# Patient Record
Sex: MALE | Race: WHITE | NOT HISPANIC OR LATINO
[De-identification: names, ages, dates, MRNs, and addresses within clinical notes are randomized per-mention and may not be internally consistent; named-entity substitution may affect disease eponyms.]

---

## 2020-12-15 PROBLEM — Z00.00 ENCOUNTER FOR PREVENTIVE HEALTH EXAMINATION: Status: ACTIVE | Noted: 2020-12-15

## 2020-12-16 ENCOUNTER — APPOINTMENT (OUTPATIENT)
Dept: GASTROENTEROLOGY | Facility: CLINIC | Age: 75
End: 2020-12-16
Payer: MEDICARE

## 2020-12-16 VITALS
OXYGEN SATURATION: 95 % | BODY MASS INDEX: 35.19 KG/M2 | TEMPERATURE: 97.5 F | DIASTOLIC BLOOD PRESSURE: 74 MMHG | HEART RATE: 54 BPM | HEIGHT: 75 IN | SYSTOLIC BLOOD PRESSURE: 120 MMHG | WEIGHT: 283 LBS

## 2020-12-16 DIAGNOSIS — Z86.79 PERSONAL HISTORY OF OTHER DISEASES OF THE CIRCULATORY SYSTEM: ICD-10-CM

## 2020-12-16 DIAGNOSIS — Z86.711 PERSONAL HISTORY OF PULMONARY EMBOLISM: ICD-10-CM

## 2020-12-16 DIAGNOSIS — Z80.0 FAMILY HISTORY OF MALIGNANT NEOPLASM OF DIGESTIVE ORGANS: ICD-10-CM

## 2020-12-16 DIAGNOSIS — Z86.718 PERSONAL HISTORY OF OTHER VENOUS THROMBOSIS AND EMBOLISM: ICD-10-CM

## 2020-12-16 DIAGNOSIS — Z86.010 PERSONAL HISTORY OF COLONIC POLYPS: ICD-10-CM

## 2020-12-16 DIAGNOSIS — Z87.891 PERSONAL HISTORY OF NICOTINE DEPENDENCE: ICD-10-CM

## 2020-12-16 DIAGNOSIS — C78.7 SECONDARY MALIGNANT NEOPLASM OF LIVER AND INTRAHEPATIC BILE DUCT: ICD-10-CM

## 2020-12-16 DIAGNOSIS — R93.5 ABNORMAL FINDINGS ON DIAGNOSTIC IMAGING OF OTHER ABDOMINAL REGIONS, INCLUDING RETROPERITONEUM: ICD-10-CM

## 2020-12-16 DIAGNOSIS — Z80.7 FAMILY HISTORY OF OTHER MALIGNANT NEOPLASMS OF LYMPHOID, HEMATOPOIETIC AND RELATED TISSUES: ICD-10-CM

## 2020-12-16 DIAGNOSIS — C80.1 SECONDARY MALIGNANT NEOPLASM OF LIVER AND INTRAHEPATIC BILE DUCT: ICD-10-CM

## 2020-12-16 PROCEDURE — 99205 OFFICE O/P NEW HI 60 MIN: CPT

## 2020-12-16 RX ORDER — RIVAROXABAN 15 MG/1
15 TABLET, FILM COATED ORAL
Refills: 0 | Status: ACTIVE | COMMUNITY

## 2020-12-17 NOTE — HISTORY OF PRESENT ILLNESS
[FreeTextEntry1] : The patient is a 75-year-old man who was discharged on Monday after a 10-day hospitalization at [facility].  He was admitted with bilateral DVTs and a PE.  He underwent venous thrombectomy of the left leg and is on Xarelto.  In the hospital CT scan of the abdomen and pelvis showed multiple hypodense hepatic lesions in both lobes of the liver which were new from May 27, 2020 compatible with metastatic disease.  There was also extensive likely partially necrotic periportal/peripancreatic, mild para-aortic and pelvic lymphadenopathy, suspicious for malignant lymphadenopathy.  The patient underwent a liver biopsy on Monday but the results are still pending.  Of note, blood work in the hospital revealed a CA 19-9 greater than 10,000 as well as a CEA of 52.9.  The patient has a history of adenomatous colonic polyps and his last colonoscopy was in December 2012, which was poorly prepped.  He has a history of prostate cancer and underwent radical prostatectomy in July.  He has a history of a gastric bypass 14 years ago.  He has been feeling well denying abdominal pain, heartburn, dysphagia, nausea, vomiting.  He reports a good appetite.  He moves his bowels once every 3 days which is his normal pattern and he denies melena or bright red blood per rectum.  Of note, the patient has lost 35 pounds since his prostatectomy in July.  The patient has not had any other hospitalizations in the past year and denies any cardiac history.

## 2020-12-17 NOTE — REASON FOR VISIT
[Spouse] : spouse [FreeTextEntry1] : Liver metastases, abnormal CT scan, history of adenomatous polyps

## 2020-12-17 NOTE — ASSESSMENT
[FreeTextEntry1] : Patient with CT scan showing multiple metastases in both lobes of the liver and significant malignant lymphadenopathy.  No clear primary was seen on the CT scan but CA 19-9 is markedly elevated greater than 10,000.  Liver biopsy results are still pending.  The patient does have a history of prostate cancer and a history of adenomatous colonic polyps.  He has just been released from the hospital after having bilateral DVT and pulmonary embolism.  The patient is on Xarelto.\par \par I had a long discussion with the patient and his wife regarding the significance of these findings.  The liver biopsy results may prove to be very telling as the blood work strongly suggest that this is metastatic pancreatic cancer.\par \par The patient is already seeing Dr. Dany Lua for oncologic treatment.  I advised that they must follow-up with him once the biopsy results are available.\par \par Although the patient is definitely in need of a colonoscopy, I would only proceed with this at this point if it would have any impact on his oncologic management.  The patient has just had a pulmonary embolism and and has recently been started on anticoagulation.

## 2020-12-17 NOTE — CONSULT LETTER
[FreeTextEntry1] : Dear Dr. Bautista  and Dr. Dany Lua\[facility] \[facility] I had the pleasure of seeing your patient SPENSER ESQUIVEL in the office today.  My office note is attached. PLEASE READ THE "ASSESSMENT" SECTION OF THE NOTE TO SEE MY IMPRESSION AND PLAN.\par \[facility] Thank you very much for allowing me to participate in the care of your patient.\[facility] \[facility] Sincerely,\[facility] \[facility] Rupesh Allison M.D., FAC, FACP\[facility] Director, Celiac Program at [facility]  of Medicine\[facility] [employer] at [facility] [place]/[facility] Director,[facility] - Gastroenterology/Internal Medicine at [address]\[place], [place] [address] Tel: [phone]\[facility] Email: [email or web address] \[facility] \[facility] The attached note has been created using a voice recognition system (Dragon).  There may be some misspellings and typos.  Please call my office if you have any issues or questions.

## 2021-01-08 ENCOUNTER — TRANSCRIPTION ENCOUNTER (OUTPATIENT)
Age: 76
End: 2021-01-08

## 2021-01-30 DIAGNOSIS — Z01.818 ENCOUNTER FOR OTHER PREPROCEDURAL EXAMINATION: ICD-10-CM

## 2021-01-31 ENCOUNTER — APPOINTMENT (OUTPATIENT)
Dept: DISASTER EMERGENCY | Facility: CLINIC | Age: 76
End: 2021-01-31

## 2021-02-01 LAB — SARS-COV-2 N GENE NPH QL NAA+PROBE: NOT DETECTED

## 2021-02-04 ENCOUNTER — RESULT REVIEW (OUTPATIENT)
Age: 76
End: 2021-02-04

## 2021-02-04 ENCOUNTER — APPOINTMENT (OUTPATIENT)
Dept: ENDOVASCULAR SURGERY | Facility: CLINIC | Age: 76
End: 2021-02-04
Payer: MEDICARE

## 2021-02-04 VITALS
SYSTOLIC BLOOD PRESSURE: 153 MMHG | TEMPERATURE: 98 F | HEART RATE: 97 BPM | BODY MASS INDEX: 35.19 KG/M2 | HEIGHT: 75 IN | WEIGHT: 283 LBS | OXYGEN SATURATION: 100 % | RESPIRATION RATE: 15 BRPM | DIASTOLIC BLOOD PRESSURE: 92 MMHG

## 2021-02-04 DIAGNOSIS — C61 MALIGNANT NEOPLASM OF PROSTATE: ICD-10-CM

## 2021-02-04 PROCEDURE — 76937 US GUIDE VASCULAR ACCESS: CPT

## 2021-02-04 PROCEDURE — 36561 INSERT TUNNELED CV CATH: CPT

## 2021-02-04 PROCEDURE — 77001 FLUOROGUIDE FOR VEIN DEVICE: CPT

## 2021-02-04 NOTE — PAST MEDICAL HISTORY
[Increasing age ( >40 years old)] : Increasing age ( >40 years old) [No therapy indicated for cases scheduled for less than one hour] : No therapy indicated for cases scheduled for less than one hour. [FreeTextEntry1] : Malignant Hyperthermia Screening Tool and Risk of Bleeding Assessment \par \par Mr. SPENSER ESQUIVEL denies family of unexpected death following Anesthesia or Exercise.\par Denies Family history of Malignant Hyperthermia, Muscle or Neuromuscular disorder and High Temperature  following exercise.\par \par Mr. SPENSER ESQUIVEL denies history of Muscle Spasm, Dark or Chocolate- Colored and Unanticipated fever immediately following anaesthesia or serious exercise.\par Mr. SPENSER ESQUIVEL also denies bleeding tendencies/Risks of Bleeding.\par

## 2021-02-04 NOTE — ASSESSMENT
[FreeTextEntry1] : Pt seen and assessed for placement of port for venous access.  Chart reviewed, imaging and labs evaluated and acceptable for intervention. Consent obtained with risks, benefits explained.  Will place port with sedation.\par \par 8 [place] powerport placed using US and fluoro guidance.  Tip in SVC.  EBL=minimal.  Full report to follow.\par

## 2021-02-04 NOTE — HISTORY OF PRESENT ILLNESS
[FreeTextEntry1] : alert and oriented x 3 \par accompanied by daughter Brandy [phone]\par no reported falls \par covid test negative 1/31/2021\par  last Xarelto Sunday  [FreeTextEntry5] : yesterday at 6 pm [FreeTextEntry6] : Dr Elizondo

## 2022-01-11 ENCOUNTER — INPATIENT (INPATIENT)
Facility: HOSPITAL | Age: 77
LOS: 3 days | Discharge: HOME | DRG: 64 | End: 2022-01-15
Attending: INTERNAL MEDICINE | Admitting: INTERNAL MEDICINE
Payer: MEDICARE

## 2022-01-11 VITALS
OXYGEN SATURATION: 98 % | DIASTOLIC BLOOD PRESSURE: 78 MMHG | RESPIRATION RATE: 16 BRPM | SYSTOLIC BLOOD PRESSURE: 148 MMHG | HEART RATE: 85 BPM

## 2022-01-11 DIAGNOSIS — I63.9 CEREBRAL INFARCTION, UNSPECIFIED: ICD-10-CM

## 2022-01-11 DIAGNOSIS — Z91.89 OTHER SPECIFIED PERSONAL RISK FACTORS, NOT ELSEWHERE CLASSIFIED: ICD-10-CM

## 2022-01-11 DIAGNOSIS — R41.82 ALTERED MENTAL STATUS, UNSPECIFIED: ICD-10-CM

## 2022-01-11 LAB
ALBUMIN SERPL ELPH-MCNC: 2.7 G/DL — LOW (ref 3.3–5)
ALP SERPL-CCNC: 156 U/L — HIGH (ref 40–120)
ALT FLD-CCNC: 14 U/L — SIGNIFICANT CHANGE UP (ref 10–45)
ANION GAP SERPL CALC-SCNC: 10 MMOL/L — SIGNIFICANT CHANGE UP (ref 5–17)
ANISOCYTOSIS BLD QL: SLIGHT — SIGNIFICANT CHANGE UP
APPEARANCE UR: CLEAR — SIGNIFICANT CHANGE UP
APTT BLD: 29.3 SEC — SIGNIFICANT CHANGE UP (ref 27.5–35.5)
AST SERPL-CCNC: 23 U/L — SIGNIFICANT CHANGE UP (ref 10–40)
BACTERIA # UR AUTO: ABNORMAL
BASE EXCESS BLDV CALC-SCNC: 4 MMOL/L — HIGH (ref -2–2)
BASOPHILS # BLD AUTO: 0 K/UL — SIGNIFICANT CHANGE UP (ref 0–0.2)
BASOPHILS NFR BLD AUTO: 0 % — SIGNIFICANT CHANGE UP (ref 0–2)
BILIRUB SERPL-MCNC: 1.2 MG/DL — SIGNIFICANT CHANGE UP (ref 0.2–1.2)
BILIRUB UR-MCNC: NEGATIVE — SIGNIFICANT CHANGE UP
BUN SERPL-MCNC: 19 MG/DL — SIGNIFICANT CHANGE UP (ref 7–23)
CA-I SERPL-SCNC: 1.19 MMOL/L — SIGNIFICANT CHANGE UP (ref 1.15–1.33)
CALCIUM SERPL-MCNC: 8.3 MG/DL — LOW (ref 8.4–10.5)
CHLORIDE BLDV-SCNC: 106 MMOL/L — SIGNIFICANT CHANGE UP (ref 96–108)
CHLORIDE SERPL-SCNC: 106 MMOL/L — SIGNIFICANT CHANGE UP (ref 96–108)
CO2 BLDV-SCNC: 31 MMOL/L — HIGH (ref 22–26)
CO2 SERPL-SCNC: 22 MMOL/L — SIGNIFICANT CHANGE UP (ref 22–31)
COLOR SPEC: YELLOW — SIGNIFICANT CHANGE UP
CREAT SERPL-MCNC: 0.8 MG/DL — SIGNIFICANT CHANGE UP (ref 0.5–1.3)
DACRYOCYTES BLD QL SMEAR: SLIGHT — SIGNIFICANT CHANGE UP
DIFF PNL FLD: NEGATIVE — SIGNIFICANT CHANGE UP
ELLIPTOCYTES BLD QL SMEAR: SIGNIFICANT CHANGE UP
EOSINOPHIL # BLD AUTO: 0.36 K/UL — SIGNIFICANT CHANGE UP (ref 0–0.5)
EOSINOPHIL NFR BLD AUTO: 4.2 % — SIGNIFICANT CHANGE UP (ref 0–6)
EPI CELLS # UR: 2 /HPF — SIGNIFICANT CHANGE UP
GAS PNL BLDV: 135 MMOL/L — LOW (ref 136–145)
GAS PNL BLDV: SIGNIFICANT CHANGE UP
GLUCOSE BLDV-MCNC: 201 MG/DL — HIGH (ref 70–99)
GLUCOSE SERPL-MCNC: 196 MG/DL — HIGH (ref 70–99)
GLUCOSE UR QL: NEGATIVE — SIGNIFICANT CHANGE UP
HCO3 BLDV-SCNC: 29 MMOL/L — SIGNIFICANT CHANGE UP (ref 22–29)
HCT VFR BLD CALC: 30.5 % — LOW (ref 39–50)
HCT VFR BLDA CALC: 29 % — LOW (ref 39–51)
HGB BLD CALC-MCNC: 9.8 G/DL — LOW (ref 12.6–17.4)
HGB BLD-MCNC: 9.8 G/DL — LOW (ref 13–17)
HYALINE CASTS # UR AUTO: 1 /LPF — SIGNIFICANT CHANGE UP (ref 0–2)
INR BLD: 1.59 RATIO — HIGH (ref 0.88–1.16)
KETONES UR-MCNC: NEGATIVE — SIGNIFICANT CHANGE UP
LACTATE BLDV-MCNC: 1.5 MMOL/L — SIGNIFICANT CHANGE UP (ref 0.7–2)
LEUKOCYTE ESTERASE UR-ACNC: ABNORMAL
LYMPHOCYTES # BLD AUTO: 0.74 K/UL — LOW (ref 1–3.3)
LYMPHOCYTES # BLD AUTO: 8.5 % — LOW (ref 13–44)
MANUAL SMEAR VERIFICATION: SIGNIFICANT CHANGE UP
MCHC RBC-ENTMCNC: 32.1 GM/DL — SIGNIFICANT CHANGE UP (ref 32–36)
MCHC RBC-ENTMCNC: 32.8 PG — SIGNIFICANT CHANGE UP (ref 27–34)
MCV RBC AUTO: 102 FL — HIGH (ref 80–100)
MONOCYTES # BLD AUTO: 0.44 K/UL — SIGNIFICANT CHANGE UP (ref 0–0.9)
MONOCYTES NFR BLD AUTO: 5.1 % — SIGNIFICANT CHANGE UP (ref 2–14)
NEUTROPHILS # BLD AUTO: 7.12 K/UL — SIGNIFICANT CHANGE UP (ref 1.8–7.4)
NEUTROPHILS NFR BLD AUTO: 82.2 % — HIGH (ref 43–77)
NITRITE UR-MCNC: POSITIVE
NRBC # BLD: 1 /100 — HIGH (ref 0–0)
PCO2 BLDV: 46 MMHG — SIGNIFICANT CHANGE UP (ref 42–55)
PH BLDV: 7.41 — SIGNIFICANT CHANGE UP (ref 7.32–7.43)
PH UR: 6 — SIGNIFICANT CHANGE UP (ref 5–8)
PLAT MORPH BLD: NORMAL — SIGNIFICANT CHANGE UP
PLATELET # BLD AUTO: 48 K/UL — LOW (ref 150–400)
PO2 BLDV: 18 MMHG — LOW (ref 25–45)
POIKILOCYTOSIS BLD QL AUTO: SIGNIFICANT CHANGE UP
POLYCHROMASIA BLD QL SMEAR: SLIGHT — SIGNIFICANT CHANGE UP
POTASSIUM BLDV-SCNC: 4.1 MMOL/L — SIGNIFICANT CHANGE UP (ref 3.5–5.1)
POTASSIUM SERPL-MCNC: 4.2 MMOL/L — SIGNIFICANT CHANGE UP (ref 3.5–5.3)
POTASSIUM SERPL-SCNC: 4.2 MMOL/L — SIGNIFICANT CHANGE UP (ref 3.5–5.3)
PROT SERPL-MCNC: 5.1 G/DL — LOW (ref 6–8.3)
PROT UR-MCNC: SIGNIFICANT CHANGE UP
PROTHROM AB SERPL-ACNC: 18.7 SEC — HIGH (ref 10.6–13.6)
RBC # BLD: 2.99 M/UL — LOW (ref 4.2–5.8)
RBC # FLD: 15.8 % — HIGH (ref 10.3–14.5)
RBC BLD AUTO: ABNORMAL
RBC CASTS # UR COMP ASSIST: 1 /HPF — SIGNIFICANT CHANGE UP (ref 0–4)
SAO2 % BLDV: 27.6 % — LOW (ref 67–88)
SARS-COV-2 RNA SPEC QL NAA+PROBE: SIGNIFICANT CHANGE UP
SODIUM SERPL-SCNC: 138 MMOL/L — SIGNIFICANT CHANGE UP (ref 135–145)
SP GR SPEC: 1.05 — SIGNIFICANT CHANGE UP (ref 1.01–1.02)
TROPONIN T, HIGH SENSITIVITY RESULT: 38 NG/L — SIGNIFICANT CHANGE UP (ref 0–51)
TROPONIN T, HIGH SENSITIVITY RESULT: 40 NG/L — SIGNIFICANT CHANGE UP (ref 0–51)
UROBILINOGEN FLD QL: NEGATIVE — SIGNIFICANT CHANGE UP
WBC # BLD: 8.66 K/UL — SIGNIFICANT CHANGE UP (ref 3.8–10.5)
WBC # FLD AUTO: 8.66 K/UL — SIGNIFICANT CHANGE UP (ref 3.8–10.5)
WBC UR QL: 14 /HPF — HIGH (ref 0–5)

## 2022-01-11 PROCEDURE — 70498 CT ANGIOGRAPHY NECK: CPT | Mod: 26,MA

## 2022-01-11 PROCEDURE — 71045 X-RAY EXAM CHEST 1 VIEW: CPT | Mod: 26

## 2022-01-11 PROCEDURE — 70496 CT ANGIOGRAPHY HEAD: CPT | Mod: 26,MA

## 2022-01-11 PROCEDURE — 93010 ELECTROCARDIOGRAM REPORT: CPT

## 2022-01-11 PROCEDURE — 99285 EMERGENCY DEPT VISIT HI MDM: CPT | Mod: GC

## 2022-01-11 RX ORDER — CEFTRIAXONE 500 MG/1
1000 INJECTION, POWDER, FOR SOLUTION INTRAMUSCULAR; INTRAVENOUS ONCE
Refills: 0 | Status: COMPLETED | OUTPATIENT
Start: 2022-01-11 | End: 2022-01-11

## 2022-01-11 RX ORDER — RIVAROXABAN 15 MG-20MG
20 KIT ORAL
Refills: 0 | Status: DISCONTINUED | OUTPATIENT
Start: 2022-01-11 | End: 2022-01-14

## 2022-01-11 RX ORDER — SODIUM CHLORIDE 9 MG/ML
1000 INJECTION INTRAMUSCULAR; INTRAVENOUS; SUBCUTANEOUS
Refills: 0 | Status: DISCONTINUED | OUTPATIENT
Start: 2022-01-11 | End: 2022-01-15

## 2022-01-11 RX ORDER — CEFTRIAXONE 500 MG/1
1000 INJECTION, POWDER, FOR SOLUTION INTRAMUSCULAR; INTRAVENOUS EVERY 24 HOURS
Refills: 0 | Status: DISCONTINUED | OUTPATIENT
Start: 2022-01-11 | End: 2022-01-15

## 2022-01-11 RX ORDER — RIVAROXABAN 15 MG-20MG
20 KIT ORAL ONCE
Refills: 0 | Status: COMPLETED | OUTPATIENT
Start: 2022-01-11 | End: 2022-01-11

## 2022-01-11 RX ORDER — DOCUSATE SODIUM 100 MG
2 CAPSULE ORAL
Qty: 0 | Refills: 0 | DISCHARGE

## 2022-01-11 RX ADMIN — SODIUM CHLORIDE 75 MILLILITER(S): 9 INJECTION INTRAMUSCULAR; INTRAVENOUS; SUBCUTANEOUS at 21:35

## 2022-01-11 RX ADMIN — RIVAROXABAN 20 MILLIGRAM(S): KIT at 12:41

## 2022-01-11 RX ADMIN — CEFTRIAXONE 100 MILLIGRAM(S): 500 INJECTION, POWDER, FOR SOLUTION INTRAMUSCULAR; INTRAVENOUS at 12:41

## 2022-01-11 NOTE — ED PROVIDER NOTE - CLINICAL SUMMARY MEDICAL DECISION MAKING FREE TEXT BOX
76yr M hx of strokes, on xarelto prostate CA and DVT w acute confusional state as stroke alert. neurology saw in ED immediately and recommended CT and CTA. exam as above. concern for acute stroke, TIA vs toxic metabolic. plan for labs, ua, cxr, CT results, and if no identifiable source, will be admitted given safety concerns for falls or not being able to take care of himself.

## 2022-01-11 NOTE — CONSULT NOTE ADULT - SUBJECTIVE AND OBJECTIVE BOX
Patient is a 76y old  Male who presents with a chief complaint of code stroke (2022 17:18)      HPI:    76yr M hx of prior strokes, prostate CA on chemo, recent DVT diagnosis requiring thrombectomy , on xarelto p/w AMS. per wife, is being confused increasingly since last night. continued to mistake places at home and complaining of not being able to see the TV however was looking at other objects.   	  wife denies fever chills, n/v, abd pain, urinary sx, cough, sorethroat, sick contacts.  is covid vax and boosted  at baseline, is alert and oriented and is functional and ambulatory  presented as stroke alert  noted to have uti  (2022 21:23)    ER vss, afebrile.  No leukocytosis.  (+) UA      REVIEW OF SYSTEMS:    CONSTITUTIONAL: No fever, weight loss, or fatigue  EYES: No eye pain, visual disturbances, or discharge  ENMT:  No sore throat  NECK: No pain or stiffness  RESPIRATORY: No cough, wheezing, chills or hemoptysis; No shortness of breath  CARDIOVASCULAR: No chest pain, palpitations, dizziness, or leg swelling  GASTROINTESTINAL: No abdominal or epigastric pain. No nausea, vomiting, or hematemesis; No diarrhea or constipation. No melena or hematochezia.  GENITOURINARY: No dysuria, frequency, hematuria, or incontinence  NEUROLOGICAL: No headaches, memory loss, loss of strength, numbness, or tremors  SKIN: No itching, burning, rashes, or lesions   LYMPH NODES: No enlarged glands  MUSCULOSKELETAL: No joint pain or swelling; No muscle, back, or extremity pain      PAST MEDICAL & SURGICAL HISTORY:  CVA (cerebral vascular accident)    Deep vein thrombosis (DVT)    Prostate CA    Hepatobiliary cancer        Allergies    Allergy Status Unknown    Intolerances        FAMILY HISTORY:    No pertinent fam hx in 1st degree relatives    SOCIAL HISTORY:  .  Lives with wife.        MEDICATIONS  (STANDING):  cefTRIAXone   IVPB 1000 milliGRAM(s) IV Intermittent every 24 hours  rivaroxaban 20 milliGRAM(s) Oral with dinner  sodium chloride 0.9%. 1000 milliLiter(s) (75 mL/Hr) IV Continuous <Continuous>    MEDICATIONS  (PRN):      Vital Signs Last 24 Hrs  T(C): 36.9 (2022 15:13), Max: 36.9 (2022 09:07)  T(F): 98.4 (2022 15:13), Max: 98.5 (2022 09:07)  HR: 75 (2022 15:13) (75 - 89)  BP: 119/69 (2022 15:13) (119/69 - 148/78)  BP(mean): --  RR: 18 (2022 15:13) (16 - 19)  SpO2: 97% (2022 15:13) (97% - 100%)    PHYSICAL EXAM:    GENERAL: NAD, well-groomed, somewhat confused  HEAD:  Atraumatic, Normocephalic  EYES: EOMI, PERRLA, conjunctiva and sclera clear  ENMT: No tonsillar erythema, exudates, or enlargement; Moist mucous membranes  NECK: Supple, No JVD  CHEST/LUNG: Clear to percussion bilaterally; No rales, rhonchi, wheezing, or rubs  HEART: Regular rate and rhythm; No murmurs, rubs, or gallops  ABDOMEN: Soft, Nontender, Nondistended; Bowel sounds present  EXTREMITIES:  2+ Peripheral Pulses, No clubbing, cyanosis, or edema  LYMPH: No lymphadenopathy noted  SKIN: No rashes or lesions    LABS:  CBC Full  -  ( 2022 09:06 )  WBC Count : 8.66 K/uL  RBC Count : 2.99 M/uL  Hemoglobin : 9.8 g/dL  Hematocrit : 30.5 %  Platelet Count - Automated : 48 K/uL  Mean Cell Volume : 102.0 fl  Mean Cell Hemoglobin : 32.8 pg  Mean Cell Hemoglobin Concentration : 32.1 gm/dL  Auto Neutrophil # : 7.12 K/uL  Auto Lymphocyte # : 0.74 K/uL  Auto Monocyte # : 0.44 K/uL  Auto Eosinophil # : 0.36 K/uL  Auto Basophil # : 0.00 K/uL  Auto Neutrophil % : 82.2 %  Auto Lymphocyte % : 8.5 %  Auto Monocyte % : 5.1 %  Auto Eosinophil % : 4.2 %  Auto Basophil % : 0.0 %      11    138  |  106  |  19  ----------------------------<  196<H>  4.2   |  22  |  0.80    Ca    8.3<L>      2022 09:06    TPro  5.1<L>  /  Alb  2.7<L>  /  TBili  1.2  /  DBili  x   /  AST  23  /  ALT  14  /  AlkPhos  156<H>        LIVER FUNCTIONS - ( 2022 09:06 )  Alb: 2.7 g/dL / Pro: 5.1 g/dL / ALK PHOS: 156 U/L / ALT: 14 U/L / AST: 23 U/L / GGT: x                               MICROBIOLOGY:        Urinalysis Basic - ( 2022 10:20 )    Color: Yellow / Appearance: Clear / S.046 / pH: x  Gluc: x / Ketone: Negative  / Bili: Negative / Urobili: Negative   Blood: x / Protein: Trace / Nitrite: Positive   Leuk Esterase: Small / RBC: 1 /hpf / WBC 14 /HPF   Sq Epi: x / Non Sq Epi: 2 /hpf / Bacteria: Many    COVID-19 PCR: NotDetec: You can help in the fight against COVID-19. [facility] may contact   you to see if you are interested in voluntarily participating in one of   our clinical trials.             RADIOLOGY:      < from: Xray Chest 1 View- PORTABLE-Urgent (22 @ 09:39) >    ACC: [id number] EXAM:  XR CHEST PORTABLE URGENT 1V                          PROCEDURE DATE:  2022          INTERPRETATION:  TIME OF EXAM: 2022 at 9:33 AM.    CLINICAL INFORMATION: Stroke code.    COMPARISON:  None available    TECHNIQUE:   AP Portable chest x-ray. Lateral bases excluded from image.    INTERPRETATION:    Heart size and the mediastinum cannot be accurately evaluated on this   projection. Calcified thoracic aorta.  CT injectable right IJ approach port with tip in the SVC.  The included lungs are clear.  No pleural effusion or pneumothorax is seen.  There is osteoarthritic degenerative change of the spine.        IMPRESSION:  The included lungs are clear.    < end of copied text >      < from: CT Angio Neck w/ IV Cont (22 @ 09:15) >    ACC: [id number] EXAM:  CT BRAIN STROKE PROTOCOL                        ACC: [id number] EXAM:  CT ANGIO BRAIN (W)AW IC                        ACC: [id number] EXAM:  CT ANGIO NECK (W)AW IC                          PROCEDURE DATE:  2022          INTERPRETATION:  Clinical Indication: Code stroke for altered mental   status, dizziness, unsteady gait, prostate cancer    5mm axial sections of the brain were obtained from base to vertex,   without the intravenous administration of contrast material. Coronal and   sagittal computer generated reconstructed are available.    No prior brain imaging is available for comparison.          The ventricles and sulci are prominent consistent age appropriate   involutional changes. Small vessel white matter ischemic changes are   noted. There is no hemorrhage. There is an old right occipital infarct.   Lucency in the left medial temporal occipital region is identified which   has the appearance of a subacute left posterior cerebral artery infarct   with sulcal effacement.        After the intravenous power injection of 70 cc of Omnipaque 300 using a   bolus munira timing run serial thin sections were obtained through the   neck from the thoracic inlet through the intracranial circulation   centered at the [id number] on a multislice CT scanner reformatted   with coronal and sagittal 2 D-MIP projections, including 3 D   reconstructions using a separate 3D File[employer] software workstation. A total   of 70  cc of Omnipaque were intravenously injected. 30  cc were discarded.    The origins of the carotid vertebral arteries are normal. The vertebral   arteries are codominant. The carotid bifurcations are unremarkable.    The distal vertebral arteries are well identified as are the   posterior-inferior cerebellar arteries bilaterally. The region of the   vertebral basilar junction is normal. The basilar artery is normal. The   right posterior cerebral and superior cerebellar arteries are normal.   There is occlusion of the distal left posterior cerebral artery.    Evaluation of the carotid arteries demonstrate normal appearance to the   distal cervical, petrous cavernous and supraclinoid internal carotid   arteries. The anterior cerebral arteries anterior communicating artery   and middle cerebral arteries are normal.      The normal intracranial venous circulation is identified. The transverse   sinuses are codominant. The superior sagittal sinus, internal cerebral   veins, vein of Shay, straight sinus, transverse sinuses, sigmoid sinuses   and internal jugular veins are normal. Cortical veins are normal.    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 Occlusion of the distal left posterior cerebral artery on   CTA.    < end of copied text >

## 2022-01-11 NOTE — ED ADULT NURSE NOTE - DRUG PRE-SCREENING (DAST -1)
Dell Mcpherson Patient Age: 66 year old  MESSAGE: Interpreting service used: No      IM/FP- Symptom-  Adult-  Other-     Symptom(s): has a lot of blisters on legs that are bursting and draining.     Patient states he was referred to be seen by a Lymphedema specialist and was referred to [facility].   States he has an appointment scheduled for today at 2:30pm and that office called to let him know that he has HMO and that if they dont receive authorization from his  Insurance he can not be seen.     Patient states he wants to be seen today because his legs are swelled up the blisters are bursting.   Patient is asking if referral can be done today before 2:30pm?  Patient is asking if [facility] offers these type of specialists?    Patient would like to speak with nurse.            Appointment: no appointment made.     Site: [facility]- Route message to [facility] RN pool ([phone])         WEIGHT AND HEIGHT:   Wt Readings from Last 1 Encounters:   06/26/20 108.8 kg (239 lb 12.8 oz)     Ht Readings from Last 1 Encounters:   06/26/20 5' 6\" (1.676 m)     BMI Readings from Last 1 Encounters:   06/26/20 38.70 kg/m²       ALLERGIES: no known allergies.  Current Outpatient Medications   Medication   • triamcinolone (ARISTOCORT) 0.1 % cream   • HYDROcodone-acetaminophen (NORCO)  MG per tablet   • furosemide (LASIX) 40 MG tablet   • cephalexin (KEFLEX) 500 MG capsule   • busPIRone (BUSPAR) 30 MG tablet   • lovastatin (MEVACOR) 20 MG tablet   • traZODone (DESYREL) 50 MG tablet   • nystatin (MYCOSTATIN) 897465 UNIT/GM powder   • blood glucose (ACCU-CHEK GUIDE) test strip   • Accu-Chek FastClix Lancets Misc   • lisinopril (ZESTRIL) 2.5 MG tablet   • pantoprazole (PROTONIX) 40 MG tablet   • levocetirizine (XYZAL) 5 MG tablet   • silver sulfADIAZINE (THERMAZENE) 1 % cream   • glimepiride (AMARYL) 2 MG tablet   • allopurinol (ZYLOPRIM) 300 MG tablet   • naLOXone (NARCAN) 4 MG/0.1ML nasal spray   • nystatin (MYCOSTATIN) 459335 UNIT/ML  suspension   • clotrimazole-betamethasone (LOTRISONE) 1-0.05 % cream   • Insulin Pen Needle (B-D U/F PEN NEEDLE) 31G X 5 MM Misc   • polyethylene glycol (MIRALAX) powder   • Blood Glucose Monitoring Suppl (ACCU-CHEK GUIDE) w/Device Kit   • meclizine HCl (ANTIVERT) 25 MG tablet     Current Facility-Administered Medications   Medication   • cyanocobalamin injection 1,000 mcg     Facility-Administered Medications Ordered in Other Visits   Medication   • regadenoson (LEXISCAN) injection 0.4 mg     PHARMACY to use:           Pharmacy preference(s) on file:   [facility] [address] [place] [address] - [address]  [address] [address]  Phone: [phone] Fax: [phone]      CALL BACK INFO: Ok to leave response (including medical information) with family member or on answering machine  ROUTING: Patient's physician/staff        PCP: Pamela Aldana DO         INS: Payor: [employer] MEDICARE / Plan: [id number] / Product Type: HMO   PATIENT ADDRESS:  [address] [place] [id number]       Statement Selected

## 2022-01-11 NOTE — H&P ADULT - NSHPPHYSICALEXAM_GEN_ALL_CORE
Vital Signs Last 24 Hrs  T(C): 36.9 (11 Jan 2022 15:13), Max: 36.9 (11 Jan 2022 09:07)  T(F): 98.4 (11 Jan 2022 15:13), Max: 98.5 (11 Jan 2022 09:07)  HR: 75 (11 Jan 2022 15:13) (75 - 89)  BP: 119/69 (11 Jan 2022 15:13) (119/69 - 148/78)  BP(mean): --  RR: 18 (11 Jan 2022 15:13) (16 - 19)  SpO2: 97% (11 Jan 2022 15:13) (97% - 100%)    PHYSICAL EXAM:  GENERAL: NAD, well-developed  HEAD:  Atraumatic, Normocephalic  EYES: EOMI, PERRLA, conjunctiva and sclera clear  NECK: Supple, No JVD  CHEST/LUNG: Clear to auscultation bilaterally; No wheeze  HEART: Regular rate and rhythm; No murmurs, rubs, or gallops  ABDOMEN: Soft, Nontender, Nondistended; Bowel sounds present  EXTREMITIES:  2+ Peripheral Pulses, No clubbing, cyanosis, or edema  PSYCH: AAOx3  NEUROLOGY: non-focal  SKIN: No rashes or lesions

## 2022-01-11 NOTE — CONSULT NOTE ADULT - ASSESSMENT
HPI: A 76 year old male with PMH of HTN, prostate cancer (diagnosed last year, unknown what he was treated with) and PSH of bipass sx has presented to the ED from home as a code stroke for AMS. Wife told EMS that pt was confused and did not know day, month, year after he was questioned and he was walking to the opposite side of what he was told. LKW was sometime last night, exact time unknown by EMS. Pt states that around 4 AM he opened his eye in bed and states that he may have had an episode of blurry vision, unsure as it was dark in the house. Pt denies any recent headache, nausea, vomiting, numbness, tingling, weakness, trauma, changes in hearing, pains, fever, chills. Pt is not a TPA candidate as he's outside window. Not a thrombectomy candidate as no LVO seen on imaging.    NIHSS 4 (no age, could not name objects- mild aphasia, R complete hemianopia)  preMRS 0    Neuro exam revealed Awake, alert, oriented to self, place, season, month, day, not to president or year (1941). poor comprehension (cannot name objects). R complete hemianopia. Left toe up.  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 Occlusion of the distal left posterior cerebral artery on CTA.    Impression: AMS may 2/2 possible toxic/metabolic/infectious etiology vs. sub acute PCA stroke    Recommendations:  Core measures-  LDL:   A1c:  Statin:  On Anticoagulation (for afib/aflutter)?  On Antiplatelets?    Recommendations:  -MRI brain w/o contrast, if no contraindications, inpatient or outpatient  -Neuro checks Q4  -Normotension  -PT/OT/Dysphagia screen  -Speech and swallow eval if dysphagia screen fails  -NPO except meds until dysphagia screen passed  -Head of bed > 30 degrees for aspiration prevention and aspiration precautions ordered.  -STAT CT head non-contrast for change in neuro exam.     Secondary prevention of stroke:  Can continue home xarelto    -Atorvastatin 80 mg daily (long-term goal LDL < 70)  -Tight glucose control (long-term goal HgbA1c < 6%)    Stroke workup:  -TTE w/ bubble study, inpatient or outpatient  -Continuous  telemetry to monitor for arrhythmia  -ILR - inpatient or outpatient- to rule out arrhythmia as a source of CVA  -Stroke labwork: HgbA1C, fasting lipid panel, CBC, CMP, coag pannel, troponin  - Baseline EKG    Prophylaxis: Lovenox 40 mg Sq daily unless contraindicated in which case SCDs   Fall precautions, aspiration precautions    Case discussed with stroke fellow Dr. Emanuel Lozoya under supervision of attending Dr. Rodrigues       HPI: A 76 year old male with PMH of HTN, prostate cancer (diagnosed last year, unknown what he was treated with) and PSH of bipass sx has presented to the ED from home as a code stroke for AMS. Wife told EMS that pt was confused and did not know day, month, year after he was questioned and he was walking to the opposite side of what he was told. LKW was sometime last night, exact time unknown by EMS. Pt states that around 4 AM he opened his eye in bed and states that he may have had an episode of blurry vision, unsure as it was dark in the house. Pt denies any recent headache, nausea, vomiting, numbness, tingling, weakness, trauma, changes in hearing, pains, fever, chills. Pt is not a TPA candidate as he's outside window. Not a thrombectomy candidate as no LVO seen on imaging.    NIHSS 4 (no age, could not name objects- mild aphasia, R complete hemianopia)  preMRS 0    Neuro exam revealed Awake, alert, oriented to self, place, season, month, day, not to president or year (1941). poor comprehension (cannot name objects). R complete hemianopia. Left toe up.  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 Occlusion of the distal left posterior cerebral artery on CTA.    Impression: AMS may 2/2 possible toxic/metabolic/infectious etiology vs. sub acute PCA stroke vs. other etiology    Recommendations:  Core measures-  LDL:   A1c:  Statin: --  On Anticoagulation (for afib/aflutter)? xarelto  On Antiplatelets? --    Recommendations:  -MRI brain w/o contrast, if no contraindications, inpatient or outpatient  -Neuro checks Q4  -Normotension  -PT/OT/Dysphagia screen  -Speech and swallow eval if dysphagia screen fails  -NPO except meds until dysphagia screen passed  -Head of bed > 30 degrees for aspiration prevention and aspiration precautions ordered.  -STAT CT head non-contrast for change in neuro exam.   -toxic metabolic infectious work up per primary team  -ultrasound of legs for DVTS    Secondary prevention of stroke:  Can continue home xarelto    -Atorvastatin 80 mg daily (long-term goal LDL < 70)  -Tight glucose control (long-term goal HgbA1c < 6%)    Stroke workup:  -TTE w/ bubble study, inpatient or outpatient  -Continuous  telemetry to monitor for arrhythmia  -ILR - inpatient or outpatient- to rule out arrhythmia as a source of CVA  -Stroke labwork: HgbA1C, fasting lipid panel, CBC, CMP, coag pannel, troponin  - Baseline EKG    Prophylaxis: Lovenox 40 mg Sq daily unless contraindicated in which case SCDs   Fall precautions, aspiration precautions    Case discussed with stroke fellow Dr. Emanuel Lozoya under supervision of attending Dr. Rodrigues       HPI: A 76 year old male with PMH of HTN, prostate cancer (diagnosed last year, unknown what he was treated with), on xarelto at home per EMS, and PSH of yolanda alvarado has presented to the ED from home as a code stroke for AMS. Wife told EMS that pt was confused and did not know day, month, year after he was questioned and he was walking to the opposite side of what he was told. LKW was sometime last night, exact time unknown by EMS. Pt states that around 4 AM he opened his eye in bed and states that he may have had an episode of blurry vision, unsure as it was dark in the house. Pt denies any recent headache, nausea, vomiting, numbness, tingling, weakness, trauma, changes in hearing, pains, fever, chills. Pt is not a TPA candidate as he's outside window. Not a thrombectomy candidate as no LVO seen on imaging. Pt states that he does not take any medications at home.     Tried to call wife but wife did not  phone.     NIHSS 4 (no age, could not name objects- mild aphasia, R complete hemianopia)  preMRS 0    Neuro exam revealed Awake, alert, oriented to self, place, season, month, day, not to president or year (1941). poor comprehension (cannot name objects). R complete hemianopia. Left toe up.  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 Occlusion of the distal left posterior cerebral artery on CTA.    Impression: AMS may 2/2 possible toxic/metabolic/infectious etiology vs. sub acute PCA stroke vs. other etiology    Recommendations:  Core measures-  LDL:   A1c:  Statin: --  On Anticoagulation (for afib/aflutter)? xarelto  On Antiplatelets? --    Recommendations:  -MRI brain w/o contrast, if no contraindications, inpatient or outpatient  -Neuro checks Q4  -Normotension  -PT/OT/Dysphagia screen  -Speech and swallow eval if dysphagia screen fails  -NPO except meds until dysphagia screen passed  -Head of bed > 30 degrees for aspiration prevention and aspiration precautions ordered.  -STAT CT head non-contrast for change in neuro exam.   -toxic metabolic infectious work up per primary team  -ultrasound of legs for DVTS    Secondary prevention of stroke:  Can continue home xarelto if its confirmed that he takes it at home  Other wise start pt on baby ASA daily    -Atorvastatin 80 mg daily (long-term goal LDL < 70)  -Tight glucose control (long-term goal HgbA1c < 6%)    Stroke workup:  -TTE w/ bubble study, inpatient or outpatient  -Continuous  telemetry to monitor for arrhythmia  -ILR - inpatient or outpatient- to rule out arrhythmia as a source of CVA  -Stroke labwork: HgbA1C, fasting lipid panel, CBC, CMP, coag pannel, troponin  - Baseline EKG    Prophylaxis: Lovenox 40 mg Sq daily unless contraindicated in which case SCDs   Fall precautions, aspiration precautions    Case discussed with stroke fellow Dr. Emanuel Lozoya under supervision of attending Dr. Rodrigues       HPI: A 76 year old male with PMH of HTN, prostate cancer (diagnosed last year, unknown what he was treated with), on xarelto at home per EMS, and PSH of yolanda alvarado has presented to the ED from home as a code stroke for AMS. Wife told EMS that pt was confused and did not know day, month, year after he was questioned and he was walking to the opposite side of what he was told. LKW was sometime last night, exact time unknown by EMS. Pt states that around 4 AM he opened his eye in bed and states that he may have had an episode of blurry vision, unsure as it was dark in the house. Pt denies any recent headache, nausea, vomiting, numbness, tingling, weakness, trauma, changes in hearing, pains, fever, chills. Pt is not a TPA candidate as he's outside window. Not a thrombectomy candidate as no LVO seen on imaging. Pt states that he does not take any medications at home.    BP on scene 148/84 glucose 164    Tried to call wife but wife did not  phone.     NIHSS 4 (no age, could not name objects- mild aphasia, R complete hemianopia)  preMRS 0    Neuro exam revealed Awake, alert, oriented to self, place, season, month, day, not to president or year (1941). poor comprehension (cannot name objects). R complete hemianopia. Left toe up.  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 Occlusion of the distal left posterior cerebral artery on CTA.    Impression: AMS may 2/2 possible toxic/metabolic/infectious etiology vs. sub acute PCA stroke vs. other etiology    Recommendations:  Core measures-  LDL:   A1c:  Statin: --  On Anticoagulation (for afib/aflutter)? xarelto  On Antiplatelets? --    Recommendations:  -MRI brain w/o contrast, if no contraindications, inpatient or outpatient  -Neuro checks Q4  -Normotension  -PT/OT/Dysphagia screen  -Speech and swallow eval if dysphagia screen fails  -NPO except meds until dysphagia screen passed  -Head of bed > 30 degrees for aspiration prevention and aspiration precautions ordered.  -STAT CT head non-contrast for change in neuro exam.   -toxic metabolic infectious work up per primary team  -ultrasound of legs for DVTS    Secondary prevention of stroke:  Can continue home xarelto if its confirmed that he takes it at home  Other wise start pt on baby ASA daily    -Atorvastatin 80 mg daily (long-term goal LDL < 70)  -Tight glucose control (long-term goal HgbA1c < 6%)    Stroke workup:  -TTE w/ bubble study, inpatient or outpatient  -Continuous  telemetry to monitor for arrhythmia  -ILR - inpatient or outpatient- to rule out arrhythmia as a source of CVA  -Stroke labwork: HgbA1C, fasting lipid panel, CBC, CMP, coag pannel, troponin  - Baseline EKG    Prophylaxis: Lovenox 40 mg Sq daily unless contraindicated in which case SCDs   Fall precautions, aspiration precautions    Case discussed with stroke fellow Dr. Emanuel Lozoya under supervision of attending Dr. Rodrigues       HPI: A 76 year old male with PMH of HTN, prostate cancer (diagnosed last year, unknown what he was treated with), on xarelto at home per EMS, and PSH of yolanda alvarado has presented to the ED from home as a code stroke for AMS. Wife told EMS that pt was confused and did not know day, month, year after he was questioned and he was walking to the opposite side of what he was told. LKW was sometime last night, exact time unknown by EMS. Pt states that around 4 AM he opened his eye in bed and states that he may have had an episode of blurry vision, unsure as it was dark in the house. Pt denies any recent headache, nausea, vomiting, numbness, tingling, weakness, trauma, changes in hearing, pains, fever, chills. Pt is not a TPA candidate as he's outside window. Not a thrombectomy candidate as no LVO seen on imaging. Pt states that he does not take any medications at home.    BP on scene 148/84 glucose 164    Tried to call wife but wife did not  phone.     NIHSS 4 (no age, could not name objects- mild aphasia, R complete hemianopia)  preMRS 0    Neuro exam revealed Awake, alert, oriented to self, place, season, month, day, not to president or year (1941). poor comprehension (cannot name objects). R complete hemianopia. Left toe up.  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 Occlusion of the distal left posterior cerebral artery on CTA.    Impression: AMS may 2/2 possible toxic/metabolic/infectious etiology vs. sub acute PCA stroke vs. other etiology. Strokes may be possibly to 2/2 cardiac etiology vs. other etiology.     Recommendations:  Core measures-  LDL:   A1c:  Statin: --  On Anticoagulation (for afib/aflutter)? xarelto  On Antiplatelets? --    Recommendations:  -MRI brain w/o contrast, if no contraindications,  -Neuro checks Q4  -Normotension  -PT/OT/Dysphagia screen  -Speech and swallow eval if dysphagia screen fails  -NPO except meds until dysphagia screen passed  -Head of bed > 30 degrees for aspiration prevention and aspiration precautions ordered.  -STAT CT head non-contrast for change in neuro exam.   -toxic metabolic infectious work up per primary team  -ultrasound of legs for DVTS    Secondary prevention of stroke:  Figure out why pt is on xarelto?  Pt on AC for unknown indiction, would serve as 2 stroke prevention.  Would recommend heme/onc to weigh in on thrombocytopenia for given AC, daily CBCs  Can continue home xarelto if its confirmed that he takes it at home, if no contraindictions  Other wise start pt on baby ASA daily    -Atorvastatin 80 mg daily (long-term goal LDL < 70)  -Tight glucose control (long-term goal HgbA1c < 6%)    Stroke workup:  TTE w/ bubble study,  Continuous  telemetry to monitor for arrhythmia  ILR - inpatient or outpatient- to rule out arrhythmia as a source of CVA  Stroke labwork: HgbA1C, fasting lipid panel, CBC, CMP, coag pannel, troponin  Baseline EKG    Prophylaxis: Lovenox 40 mg Sq daily unless contraindicated in which case SCDs   Fall precautions, aspiration precautions    Case discussed with stroke fellow Dr. Emanuel Lozoya under supervision of attending Dr. Rodrigues       HPI: A 76 year old male with PMH of HTN, prostate cancer (diagnosed last year, unknown what he was treated with), on xarelto at home per EMS, and PSH of yolanda alvarado has presented to the ED from home as a code stroke for AMS. Wife told EMS that pt was confused and did not know day, month, year after he was questioned and he was walking to the opposite side of what he was told. LKW was sometime last night, exact time unknown by EMS. Pt states that around 4 AM he opened his eye in bed and states that he may have had an episode of blurry vision, unsure as it was dark in the house. Pt denies any recent headache, nausea, vomiting, numbness, tingling, weakness, trauma, changes in hearing, pains, fever, chills. Pt is not a TPA candidate as he's outside window. Not a thrombectomy candidate as no LVO seen on imaging. Pt states that he does not take any medications at home.    BP on scene 148/84 glucose 164    Tried to call wife but wife did not  phone.     NIHSS 4 (no age, could not name objects- mild aphasia, R complete hemianopia)  preMRS 0    Neuro exam revealed Awake, alert, oriented to self, place, season, month, day, not to president or year (1941). poor comprehension (cannot name objects). R complete hemianopia. Left toe up.  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 Occlusion of the distal left posterior cerebral artery on CTA.    Impression: AMS may 2/2 possible toxic/metabolic/infectious etiology vs. other etiology. Strokes may be possibly to 2/2 cardiac etiology vs. other etiology.     Recommendations:  Core measures-  LDL:   A1c:  Statin: --  On Anticoagulation (for afib/aflutter)? xarelto  On Antiplatelets? --    Recommendations:  -MRI brain w/o contrast, if no contraindications,  -Neuro checks Q4  -Normotension  -PT/OT/Dysphagia screen  -Speech and swallow eval if dysphagia screen fails  -NPO except meds until dysphagia screen passed  -Head of bed > 30 degrees for aspiration prevention and aspiration precautions ordered.  -STAT CT head non-contrast for change in neuro exam.   -toxic metabolic infectious work up per primary team  -ultrasound of legs for DVTS  -May consider routine EEG if AMS does not resolve    Secondary prevention of stroke:  Figure out why pt is on xarelto?  Pt on AC for unknown indiction, would serve as 2 stroke prevention.  Would recommend heme/onc to weigh in on thrombocytopenia for given AC, daily CBCs  Can continue home xarelto if its confirmed that he takes it at home, if no contraindictions  Other wise start pt on baby ASA daily    -Atorvastatin 80 mg daily (long-term goal LDL < 70)  -Tight glucose control (long-term goal HgbA1c < 6%)    Stroke workup:  TTE w/ bubble study,  Continuous  telemetry to monitor for arrhythmia  ILR - inpatient or outpatient- to rule out arrhythmia as a source of CVA  Stroke labwork: HgbA1C, fasting lipid panel, CBC, CMP, coag pannel, troponin  Baseline EKG    Prophylaxis: Lovenox 40 mg Sq daily unless contraindicated in which case SCDs   Fall precautions, aspiration precautions    Case discussed with stroke fellow Dr. Emanuel Lozoya under supervision of attending Dr. Rodrigues       HPI: A 76 year old male with PMH of HTN, prostate cancer (diagnosed last year, unknown what he was treated with), on xarelto at home per EMS, and PSH of yolanda alvarado has presented to the ED from home as a code stroke for AMS. Wife told EMS that pt was confused and did not know day, month, year after he was questioned and he was walking to the opposite side of what he was told. LKW was sometime last night, exact time unknown by EMS. Pt states that around 4 AM he opened his eye in bed and states that he may have had an episode of blurry vision, unsure as it was dark in the house. Pt denies any recent headache, nausea, vomiting, numbness, tingling, weakness, trauma, changes in hearing, pains, fever, chills. Pt is not a TPA candidate as he's outside window. Not a thrombectomy candidate as no LVO seen on imaging. Pt states that he does not take any medications at home.    BP on scene 148/84 glucose 164    Tried to call wife but wife did not  phone.     NIHSS 4 (no age, could not name objects- mild aphasia, R complete hemianopia)  preMRS 0    Neuro exam revealed Awake, alert, oriented to self, place, season, month, day, not to president or year (1941). poor comprehension (cannot name objects). R complete hemianopia. Left toe up.  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 Occlusion of the distal left posterior cerebral artery on CTA.    Impression: AMS may 2/2 possible toxic/metabolic/infectious etiology vs. other etiology. Strokes may be possibly to 2/2 cardiac etiology vs. other etiology.     Recommendations:  Core measures-  LDL:   A1c:  Statin: --  On Anticoagulation (for afib/aflutter)? xarelto  On Antiplatelets? --    Recommendations:  -MRI brain w/o contrast, if no contraindications,  -Neuro checks Q4  -Normotension  -PT/OT/Dysphagia screen  -Speech and swallow eval if dysphagia screen fails  -NPO except meds until dysphagia screen passed  -Head of bed > 30 degrees for aspiration prevention and aspiration precautions ordered.  -STAT CT head non-contrast for change in neuro exam.   -toxic metabolic infectious work up per primary team  -ultrasound of legs for DVTS  -May consider routine EEG if AMS does not resolve    Secondary prevention of stroke:  Figure out why pt is on xarelto?  Pt on AC for unknown indiction, can serve as secondary stroke prevention.  Would recommend heme/onc to weigh in on thrombocytopenia for xarelto as anticoagulation, daily CBCs  Can continue home xarelto if its confirmed that he takes it at home, if no contraindictions  Other wise start pt on baby ASA daily    -Atorvastatin 80 mg daily (long-term goal LDL < 70)  -Tight glucose control (long-term goal HgbA1c < 6%)    Stroke workup:  TTE w/ bubble study,  Continuous  telemetry to monitor for arrhythmia  ILR - inpatient or outpatient- to rule out arrhythmia as a source of CVA  Stroke labwork: HgbA1C, fasting lipid panel, CBC, CMP, coag pannel, troponin  Baseline EKG    Prophylaxis: Lovenox 40 mg Sq daily unless contraindicated in which case SCDs   Fall precautions, aspiration precautions    Case discussed with stroke fellow Dr. Emanuel Lozoya under supervision of attending Dr. Rodrigues       HPI: A 76 year old male with PMH of HTN, prostate cancer (diagnosed last year, unknown what he was treated with), recent DVT diagnosis requiring thrombectomy on xarelto, and PSH of bipass sx has presented to the ED from home as a code stroke for AMS. Wife told EMS that pt has been confused  since last night and did not know day, month, year after he was questioned and he was walking to the opposite side of what he was told. LKW was sometime last night, exact time unknown by EMS. Pt states that around 4 AM he opened his eye in bed and states that he may have had an episode of blurry vision, unsure as it was dark in the house. Pt denies any recent headache, nausea, vomiting, numbness, tingling, weakness, trauma, changes in hearing, pains, fever, chills. Pt is not a TPA candidate as he's outside window. Not a thrombectomy candidate as no LVO seen on imaging. Pt states that he does not take any medications at home.    BP on scene 148/84 glucose 164    Tried to call wife but wife did not  phone.     NIHSS 4 (no age, could not name objects- mild aphasia, R complete hemianopia)  preMRS 0    Neuro exam revealed Awake, alert, oriented to self, place, season, month, day, not to president or year (1941). poor comprehension (cannot name objects). R complete hemianopia. Left toe up.  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 Occlusion of the distal left posterior cerebral artery on CTA.    Impression: AMS may 2/2 possible toxic/metabolic/infectious etiology vs. other etiology. Strokes may be possibly to 2/2 cardiac etiology vs. other etiology.     Recommendations:  Core measures-  LDL:   A1c:  Statin: --  On Anticoagulation (for afib/aflutter)? xarelto  On Antiplatelets? --    Recommendations:  -MRI brain w/o contrast, if no contraindications,  -Neuro checks Q4  -Normotension  -PT/OT/Dysphagia screen  -Speech and swallow eval if dysphagia screen fails  -NPO except meds until dysphagia screen passed  -Head of bed > 30 degrees for aspiration prevention and aspiration precautions ordered.  -STAT CT head non-contrast for change in neuro exam.   -toxic metabolic infectious work up per primary team  -ultrasound of legs for DVTS  -May consider routine EEG if AMS does not resolve    Secondary prevention of stroke:  Figure out why pt is on xarelto?  Pt on AC for unknown indiction, can serve as secondary stroke prevention.  Would recommend heme/onc to weigh in on thrombocytopenia for xarelto as anticoagulation, daily CBCs  Can continue home xarelto if its confirmed that he takes it at home, if no contraindictions  Other wise start pt on baby ASA daily    -Atorvastatin 80 mg daily (long-term goal LDL < 70)  -Tight glucose control (long-term goal HgbA1c < 6%)    Stroke workup:  TTE w/ bubble study,  Continuous  telemetry to monitor for arrhythmia  ILR - inpatient or outpatient- to rule out arrhythmia as a source of CVA  Stroke labwork: HgbA1C, fasting lipid panel, CBC, CMP, coag pannel, troponin  Baseline EKG    Prophylaxis: Lovenox 40 mg Sq daily unless contraindicated in which case SCDs   Fall precautions, aspiration precautions    Case discussed with stroke fellow Dr. Emanuel Lozoya under supervision of attending Dr. Rodrigues       HPI: A 76 year old male with PMH of HTN, prostate cancer (diagnosed last year, unknown what he was treated with), recent DVT diagnosis requiring thrombectomy on xarelto, and PSH of bipass sx has presented to the ED from home as a code stroke for AMS. Wife told EMS that pt has been confused  since last night and did not know day, month, year after he was questioned and he was walking to the opposite side of what he was told. LKW was sometime last night, exact time unknown by EMS. Pt states that around 4 AM he opened his eye in bed and states that he may have had an episode of blurry vision, unsure as it was dark in the house. Pt denies any recent headache, nausea, vomiting, numbness, tingling, weakness, trauma, changes in hearing, pains, fever, chills. Pt is not a TPA candidate as he's outside window. Not a thrombectomy candidate as no LVO seen on imaging. Pt states that he does not take any medications at home.    BP on scene 148/84 glucose 164    Tried to call wife but wife did not  phone.     NIHSS 4 (no age, could not name objects- mild aphasia, R complete hemianopia)  preMRS 0    Neuro exam revealed Awake, alert, oriented to self, place, season, month, day, not to president or year (1942). poor comprehension (cannot name objects). R complete hemianopia. Left toe up.  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 Occlusion of the distal left posterior cerebral artery on CTA.    Impression: AMS may 2/2 possible toxic/metabolic/infectious etiology vs. other etiology. Strokes may be possibly to 2/2 cardiac etiology vs. other etiology.     Recommendations:  Core measures-  LDL:   A1c:  Statin: --  On Anticoagulation (for afib/aflutter)? xarelto  On Antiplatelets? --    Recommendations:  -MRI brain w/o contrast, if no contraindications,  -Neuro checks Q4  -Normotension  -PT/OT/Dysphagia screen  -Speech and swallow eval if dysphagia screen fails  -NPO except meds until dysphagia screen passed  -Head of bed > 30 degrees for aspiration prevention and aspiration precautions ordered.  -STAT CT head non-contrast for change in neuro exam.   -toxic metabolic infectious work up per primary team  -ultrasound of legs for DVTS  -May consider routine EEG if AMS does not resolve    Secondary prevention of stroke:  On xarelto for recent DVT  Would recommend heme/onc to weigh in on thrombocytopenia for xarelto as anticoagulation  Can continue home xarelto as secondary stroke prevention, if no contraindications including thrombocytopenia  It xarelto cannot be started, may start pt on baby ASA daily. Monitor daily CBC (for platelets consistently <50,000 reconsider use of ASA)    -Atorvastatin 80 mg daily (long-term goal LDL < 70)  -Tight glucose control (long-term goal HgbA1c < 6%)    Stroke workup:  TTE w/ bubble study,  Continuous  telemetry to monitor for arrhythmia  ILR - inpatient or outpatient- to rule out arrhythmia as a source of CVA  Stroke labwork: HgbA1C, fasting lipid panel, CBC, CMP, coag pannel, troponin  Baseline EKG    Prophylaxis: Lovenox 40 mg Sq daily unless contraindicated in which case SCDs   Fall precautions, aspiration precautions    Case discussed with stroke fellow Dr. Emanuel Lozoya under supervision of attending Dr. Rodrigues       HPI: A 76 year old male poor historian with PMH of HTN, prostate cancer (diagnosed last year, unknown what he was treated with), recent DVT diagnosis requiring thrombectomy on xarelto, and PSH of bipass sx has presented to the ED from home as a code stroke for AMS. Wife told EMS that pt has been confused  since last night and did not know day, month, year after he was questioned and he was walking to the opposite side of what he was told. LKW was sometime last night, exact time unknown by EMS. Pt states that around 4 AM he opened his eye in bed and states that he may have had an episode of blurry vision, unsure as it was dark in the house. Pt denies any recent headache, nausea, vomiting, numbness, tingling, weakness, trauma, changes in hearing, pains, fever, chills. Pt is not a TPA candidate as he's outside window. Not a thrombectomy candidate as no LVO seen on imaging. Pt states that he does not take any medications at home.    BP on scene 148/84 glucose 164    Tried to call wife but wife did not  phone.     NIHSS 4 (no age, could not name objects- mild aphasia, R complete hemianopia)  preMRS 0    Neuro exam revealed Awake, alert, oriented to self, place, season, month, day, not to president or year (1941). poor comprehension (cannot name objects). R complete hemianopia. Left toe up.  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 Occlusion of the distal left posterior cerebral artery on CTA.    Impression: AMS may 2/2 possible toxic/metabolic/infectious etiology vs. other etiology. Strokes may be possibly to 2/2 cardiac etiology vs. other etiology.     Recommendations:  Core measures-  LDL:   A1c:  Statin: --  On Anticoagulation (for afib/aflutter)? xarelto  On Antiplatelets? --    Recommendations:  -MRI brain w/o contrast, if no contraindications,  -Neuro checks Q4  -Normotension  -PT/OT/Dysphagia screen  -Speech and swallow eval if dysphagia screen fails  -NPO except meds until dysphagia screen passed  -Head of bed > 30 degrees for aspiration prevention and aspiration precautions ordered.  -STAT CT head non-contrast for change in neuro exam.   -toxic metabolic infectious work up per primary team  -ultrasound of legs for DVTS  -May consider routine EEG if AMS does not resolve    Secondary prevention of stroke:  On xarelto for recent DVT  Would recommend heme/onc to weigh in on thrombocytopenia for xarelto as anticoagulation  Can continue home xarelto as secondary stroke prevention, if no contraindications including thrombocytopenia  It xarelto cannot be started, may start pt on baby ASA daily. Monitor daily CBC (for platelets consistently <50,000 reconsider use of ASA)    -Atorvastatin 80 mg daily (long-term goal LDL < 70)  -Tight glucose control (long-term goal HgbA1c < 6%)    Stroke workup:  TTE w/ bubble study,  Continuous  telemetry to monitor for arrhythmia  ILR - inpatient or outpatient- to rule out arrhythmia as a source of CVA  Stroke labwork: HgbA1C, fasting lipid panel, CBC, CMP, coag pannel, troponin  Baseline EKG    Prophylaxis: Lovenox 40 mg Sq daily unless contraindicated in which case SCDs   Fall precautions, aspiration precautions    Case discussed with stroke fellow Dr. Emanuel Lozoya under supervision of attending Dr. Rodrigues       HPI: A 76 year old male poor historian with PMH of HTN, prostate cancer (diagnosed last year, unknown what he was treated with), recent DVT diagnosis requiring thrombectomy on xarelto, and PSH of bipass sx has presented to the ED from home as a code stroke for AMS. Wife told EMS that pt has been confused  since last night and did not know day, month, year after he was questioned and he was walking to the opposite side of what he was told. LKW was sometime last night, exact time unknown by EMS. Pt states that around 4 AM he opened his eye in bed and states that he may have had an episode of blurry vision, unsure as it was dark in the house. Pt denies any recent headache, nausea, vomiting, numbness, tingling, weakness, trauma, changes in hearing, pains, fever, chills. Pt is not a TPA candidate as he's outside window. Not a thrombectomy candidate as no LVO seen on imaging. Pt states that he does not take any medications at home.    BP on scene 148/84 glucose 164    Tried to call wife but wife did not  phone.     NIHSS 4 (no age, could not name objects- mild aphasia, R complete hemianopia)  preMRS 0    Neuro exam revealed Awake, alert, oriented to self, place, season, month, day, not to president or year (1941). poor comprehension (cannot name objects). R complete hemianopia. Left toe up.  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 Occlusion of the distal left posterior cerebral artery on CTA.    Impression: AMS may 2/2 possible toxic/metabolic/infectious etiology vs. other etiology. Strokes may be possibly to 2/2 cardiac etiology vs. other etiology.     Core measures-  LDL:   A1c:  Statin: --  On Anticoagulation (for afib/aflutter)? xarelto  On Antiplatelets? --    Recommendations:  -MRI brain w/o contrast, if no contraindications,  -Neuro checks Q4  -Normotension  -PT/OT/Dysphagia screen  -Speech and swallow eval if dysphagia screen fails  -NPO except meds until dysphagia screen passed  -Head of bed > 30 degrees for aspiration prevention and aspiration precautions ordered.  -STAT CT head non-contrast for change in neuro exam.   -toxic metabolic infectious work up per primary team including CBC, CMP, UA, folate, B1, B12, B6, ESR, CRP, TSH, T4, ammonia level,  MMA, homocysteine, Vit E, ammonia, Ca, heavy metals, VDRL, UA, lyme, NMDA ab  -ultrasound of legs for DVTS  -May consider routine EEG if AMS does not resolve    Secondary prevention of stroke:  On xarelto for recent DVT  Would recommend heme/onc to weigh in on thrombocytopenia for xarelto as anticoagulation  Can continue home xarelto as secondary stroke prevention, if no contraindications including thrombocytopenia  It xarelto cannot be started, may start pt on baby ASA daily. Monitor daily CBC (for platelets consistently <50,000 reconsider use of ASA)    -Atorvastatin 80 mg daily (long-term goal LDL < 70)  -Tight glucose control (long-term goal HgbA1c < 6%)    Stroke workup:  TTE w/ bubble study,  Continuous  telemetry to monitor for arrhythmia  ILR - inpatient or outpatient- to rule out arrhythmia as a source of CVA  Stroke labwork: HgbA1C, fasting lipid panel, CBC, CMP, coag pannel, troponin  Baseline EKG    Prophylaxis: Lovenox 40 mg Sq daily unless contraindicated in which case SCDs   Fall precautions, aspiration precautions    Case discussed with stroke fellow Dr. Emanuel Lozoya. Case to be discussed with neurologist, Dr. Jo.                 HPI: A 76 year old male poor historian with PMH of HTN, prostate cancer diagnosed 6/2019 s/p prostectemy, hepatic carcinoma dx Nov 2019 on chemotherapy, recent DVT diagnosis requiring thrombectomy on xarelto, and PSH of bipass sx has presented to the ED from home as a code stroke for AMS. Wife told EMS that pt has been confused  since last night and did not know day, month, year after he was questioned and he was walking to the opposite side of what he was told. LKW was sometime last night, exact time unknown by EMS. Pt states that around 4 AM he opened his eye in bed and states that he may have had an episode of blurry vision, unsure as it was dark in the house. Pt denies any recent headache, nausea, vomiting, numbness, tingling, weakness, trauma, changes in hearing, pains, fever, chills. Pt is not a TPA candidate as he's outside window. Not a thrombectomy candidate as no LVO seen on imaging. Pt states that he does not take any medications at home.    BP on scene 148/84 glucose 164    NIHSS 4 (no age, could not name objects- mild aphasia, R complete hemianopia)  preMRS 0    Neuro exam revealed Awake, alert, oriented to self, place, season, month, day, not to president or year (1941). Cannot spell world backwards. Cannot do substractions.  poor comprehension (cannot name objects). R complete hemianopia. Left toe up.  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 Occlusion of the distal left posterior cerebral artery on CTA.    Impression: AMS may 2/2 possible toxic/metabolic/infectious etiology vs. other etiology. Strokes may be possibly to 2/2 cardiac etiology vs. other etiology.     Core measures-  LDL:   A1c:  Statin: --  On Anticoagulation (for afib/aflutter)? xarelto  On Antiplatelets? --    Recommendations:  -MRI brain w/o contrast, if no contraindications,  -Neuro checks Q4  -Normotension  -PT/OT/Dysphagia screen  -Speech and swallow eval if dysphagia screen fails  -NPO except meds until dysphagia screen passed  -Head of bed > 30 degrees for aspiration prevention and aspiration precautions ordered.  -STAT CT head non-contrast for change in neuro exam.   -toxic metabolic infectious work up per primary team including CBC, CMP, UA, folate, B1, B12, B6, ESR, CRP, TSH, T4, ammonia level,  MMA, homocysteine, Vit E, ammonia, Ca, heavy metals, VDRL, UA, lyme, NMDA ab  -ultrasound of legs for DVTS  -May consider routine EEG if AMS does not resolve    Secondary prevention of stroke:  On xarelto for recent DVT  Would recommend heme/onc to weigh in on thrombocytopenia for xarelto as anticoagulation  Can continue home xarelto as secondary stroke prevention, if no contraindications including thrombocytopenia  It xarelto cannot be started, may start pt on baby ASA daily. Monitor daily CBC (for platelets consistently <50,000 reconsider use of ASA)    -Atorvastatin 80 mg daily (long-term goal LDL < 70)  -Tight glucose control (long-term goal HgbA1c < 6%)    Stroke workup:  TTE w/ bubble study,  Continuous  telemetry to monitor for arrhythmia  ILR - inpatient or outpatient- to rule out arrhythmia as a source of CVA  Stroke labwork: HgbA1C, fasting lipid panel, CBC, CMP, coag pannel, troponin  Baseline EKG    Prophylaxis: Lovenox 40 mg Sq daily unless contraindicated in which case SCDs   Fall precautions, aspiration precautions    Case discussed with stroke fellow Dr. Emanuel Lozoya. Case to be discussed with neurologist, Dr. Jo.                 HPI: A 76 year old male poor historian with PMH of HTN, prostate cancer diagnosed 6/2019 s/p prostectemy, hepatic carcinoma dx Nov 2019 on chemotherapy, recent DVT diagnosis requiring thrombectomy on xarelto, and PSH of bipass sx has presented to the ED from home as a code stroke for AMS. Wife told EMS that pt has been confused  since last night and did not know day, month, year after he was questioned and he was walking to the opposite side of what he was told. LKW was sometime last night, exact time unknown by EMS. Pt states that around 4 AM he opened his eye in bed and states that he may have had an episode of blurry vision, unsure as it was dark in the house. Pt denies any recent headache, nausea, vomiting, numbness, tingling, weakness, trauma, changes in hearing, pains, fever, chills. Pt is not a TPA candidate as he's outside window. Not a thrombectomy candidate as no LVO seen on imaging. Pt states that he does not take any medications at home.    BP on scene 148/84 glucose 164    NIHSS 4 (no age, could not name objects- mild aphasia, R complete hemianopia)  preMRS 0    Neuro exam revealed Awake, alert, oriented to self, place, season, month, day, not to president or year (1941). Cannot spell world backwards. Cannot do substractions.  poor comprehension (cannot name objects). R complete hemianopia. Left toe up.  Old right occipital infarct. Subacute left posterior cerebral artery infarct. Occlusion of the distal left posterior cerebral artery on CTA.  UA positive.     Impression: AMS may 2/2 possible toxic/metabolic/infectious etiology (UTI) vs. other etiology. Strokes may be possibly to 2/2 cardiac etiology vs. other etiology.     Core measures-  LDL:   A1c:  Statin: --  On Anticoagulation (for afib/aflutter)? xarelto  On Antiplatelets? --    Recommendations:  -MRI brain w/o contrast, if no contraindications,  -Neuro checks Q4  -Normotension  -PT/OT/Dysphagia screen  -Speech and swallow eval if dysphagia screen fails  -NPO except meds until dysphagia screen passed  -Head of bed > 30 degrees for aspiration prevention and aspiration precautions ordered.  -STAT CT head non-contrast for change in neuro exam.   -toxic metabolic infectious work up per primary team including CBC, CMP, UA, folate, B1, B12, B6, ESR, CRP, TSH, T4, ammonia level,  MMA, homocysteine, Vit E, ammonia, Ca, heavy metals, VDRL, UA, lyme, NMDA ab  -ultrasound of legs for DVTS  -May consider routine EEG if AMS does not resolve    Secondary prevention of stroke:  On xarelto for recent DVT  Would recommend heme/onc to weigh in on thrombocytopenia for xarelto as anticoagulation  Can continue home xarelto as secondary stroke prevention, if no contraindications including thrombocytopenia  It xarelto cannot be started, may start pt on baby ASA daily. Monitor daily CBC (for platelets consistently <50,000 reconsider use of ASA)    -Atorvastatin 80 mg daily (long-term goal LDL < 70)  -Tight glucose control (long-term goal HgbA1c < 6%)    Stroke workup:  TTE w/ bubble study,  Continuous  telemetry to monitor for arrhythmia  ILR - inpatient or outpatient- to rule out arrhythmia as a source of CVA  Stroke labwork: HgbA1C, fasting lipid panel, CBC, CMP, coag pannel, troponin  Baseline EKG    Prophylaxis: Lovenox 40 mg Sq daily unless contraindicated in which case SCDs   Fall precautions, aspiration precautions    Case discussed with stroke fellow Dr. Emanuel Lozoya. Case to be discussed with neurologist, Dr. Jo.                 HPI: A 76 year old male poor historian with PMH of HTN, prostate cancer diagnosed 6/2019 s/p prostectemy, hepatic carcinoma dx Nov 2019 on chemotherapy, recent DVT diagnosis requiring thrombectomy on xarelto, and PSH of bipass sx has presented to the ED from home as a code stroke for AMS. Wife told EMS that pt has been confused  since last night and did not know day, month, year after he was questioned and he was walking to the opposite side of what he was told. LKW was sometime last night, exact time unknown by EMS. Pt states that around 4 AM he opened his eye in bed and states that he may have had an episode of blurry vision, unsure as it was dark in the house. Pt denies any recent headache, nausea, vomiting, numbness, tingling, weakness, trauma, changes in hearing, pains, fever, chills. Pt is not a TPA candidate as he's outside window. Not a thrombectomy candidate as no LVO seen on imaging. Pt states that he does not take any medications at home.    BP on scene 148/84 glucose 164    NIHSS 4 (no age, could not name objects- mild aphasia, R complete hemianopia)  preMRS 0    Neuro exam revealed Awake, alert, oriented to self, place, season, month, day, not to president or year (1941). Cannot spell world backwards. Cannot do substractions.  poor comprehension (cannot name objects). R complete hemianopia. Left toe up.  Old right occipital infarct. Subacute left posterior cerebral artery infarct. Occlusion of the distal left posterior cerebral artery on CTA.  UA positive.     Impression: AMS may 2/2 possible toxic/metabolic/infectious etiology (UTI) vs. other etiology. Strokes may be possibly to 2/2 cardiac etiology vs. other etiology.     Core measures-  LDL:   A1c:  Statin: --  On Anticoagulation (for afib/aflutter)? xarelto  On Antiplatelets? --    Recommendations:  -MRI brain w/o contrast, if no contraindications,  -Neuro checks Q4  -Normotension  -PT/OT/Dysphagia screen  -Speech and swallow eval if dysphagia screen fails  -NPO except meds until dysphagia screen passed  -Head of bed > 30 degrees for aspiration prevention and aspiration precautions ordered.  -STAT CT head non-contrast for change in neuro exam.   -toxic metabolic infectious work up per primary team including CBC, CMP, UA, folate, B1, B12, B6, ESR, CRP, TSH, T4, ammonia level,  MMA, homocysteine, Vit E, ammonia, Ca, heavy metals, VDRL, UA, lyme, NMDA ab  -ultrasound of legs for DVTS  -May consider routine EEG if AMS does not resolve    Secondary prevention of stroke:  On xarelto for recent DVT  Would recommend heme/onc to weigh in on thrombocytopenia for xarelto as anticoagulation  Can continue home xarelto as secondary stroke prevention, if no contraindications including thrombocytopenia  It xarelto cannot be started, may start pt on baby ASA daily from neurovascular standpoint. Monitor daily CBC (for platelets consistently <50,000 reconsider use of ASA)    -Atorvastatin 80 mg daily (long-term goal LDL < 70)  -Tight glucose control (long-term goal HgbA1c < 6%)    Stroke workup:  TTE w/ bubble study,  Continuous  telemetry to monitor for arrhythmia  ILR - inpatient or outpatient- to rule out arrhythmia as a source of CVA  Stroke labwork: HgbA1C, fasting lipid panel, CBC, CMP, coag pannel, troponin  Baseline EKG    Prophylaxis: Lovenox 40 mg Sq daily unless contraindicated in which case SCDs   Fall precautions, aspiration precautions    Case discussed with stroke fellow Dr. Emanuel Lozoya. Case to be discussed with neurologist, Dr. Jo.                 HPI: A 76 year old male poor historian with PMH of HTN, prostate cancer diagnosed 6/2019 s/p prostectemy, hepatic carcinoma dx Nov 2019 on chemotherapy, recent DVT diagnosis requiring thrombectomy on xarelto, and PSH of bipass sx has presented to the ED from home as a code stroke for AMS. Wife told EMS that pt has been confused  since last night and did not know day, month, year after he was questioned and he was walking to the opposite side of what he was told. LKW was sometime last night, exact time unknown by EMS. Pt states that around 4 AM he opened his eye in bed and states that he may have had an episode of blurry vision, unsure as it was dark in the house. Pt denies any recent headache, nausea, vomiting, numbness, tingling, weakness, trauma, changes in hearing, pains, fever, chills. Pt is not a TPA candidate as he's outside window. Not a thrombectomy candidate as no LVO seen on imaging. Pt states that he does not take any medications at home.    BP on scene 148/84 glucose 164    NIHSS 4 (no age, could not name objects- mild aphasia, R complete hemianopia)  preMRS 0    Neuro exam revealed Awake, alert, oriented to self, place, season, month, day, not to president or year (1941). Cannot spell world backwards. Cannot do substractions.  poor comprehension (cannot name objects). R complete hemianopia. Left toe up.  Old right occipital infarct. Subacute left posterior cerebral artery infarct. Occlusion of the distal left posterior cerebral artery on CTA.  UA positive.     Impression: AMS may 2/2 possible toxic/metabolic/infectious etiology (UTI) vs. other etiology. Strokes may be possibly to 2/2 cardiac etiology vs. other etiology.     Core measures-  LDL:   A1c:  Statin: --  On Anticoagulation (for afib/aflutter)? xarelto  On Antiplatelets? --    Recommendations:  -MRI brain w/o contrast, if no contraindications,  -Neuro checks Q4  -Normotension  -PT/OT/Dysphagia screen  -Speech and swallow eval if dysphagia screen fails  -NPO except meds until dysphagia screen passed  -Head of bed > 30 degrees for aspiration prevention and aspiration precautions ordered.  -STAT CT head non-contrast for change in neuro exam.   -toxic metabolic infectious work up per primary team including CBC, CMP, UA, folate, B1, B12, B6, ESR, CRP, TSH, T4, ammonia level,  MMA, homocysteine, Vit E, ammonia, Ca, heavy metals, VDRL, UA, lyme, NMDA ab  -ultrasound of legs for DVTS  -May consider routine EEG if AMS does not resolve    Secondary prevention of stroke:  On xarelto for recent DVT  Would recommend heme/onc to weigh in on thrombocytopenia for xarelto as anticoagulation  Can continue home xarelto as secondary stroke prevention, if no contraindications including thrombocytopenia  It xarelto cannot be started, may start pt on baby ASA daily from neurovascular standpoint. Monitor daily CBC (for platelets consistently <50,000 reconsider use of ASA)    -Atorvastatin 80 mg daily (long-term goal LDL < 70)  -Tight glucose control (long-term goal HgbA1c < 6%)    Stroke workup:  TTE w/ bubble study,  Continuous  telemetry to monitor for arrhythmia  ILR - inpatient or outpatient- to rule out arrhythmia as a source of CVA  Stroke labwork: HgbA1C, fasting lipid panel, CBC, CMP, coag pannel, troponin  Baseline EKG    Prophylaxis: Lovenox 40 mg Sq daily unless contraindicated in which case SCDs   Fall precautions, aspiration precautions    Case discussed with stroke fellow Dr. Emanuel Lozoya. Case to be discussed with neurologist, Dr. Rodrigues.

## 2022-01-11 NOTE — CONSULT NOTE ADULT - ASSESSMENT
A 76 year old male poor historian with PMH of HTN, prostate cancer diagnosed 6/2019 s/p prostectemy, hepatic carcinoma dx Nov 2019 on chemotherapy, recent DVT diagnosis requiring thrombectomy on xarelto, and PSH of bipass sx A 76 year old male poor historian with PMH of HTN, prostate cancer diagnosed 6/2019 s/p prostectemy, hepatic carcinoma dx Nov 2019 on chemotherapy, recent DVT diagnosis requiring thrombectomy on xarelto, and PSH of bipass sx presenting with altered mental status.

## 2022-01-11 NOTE — ED ADULT NURSE NOTE - NSIMPLEMENTINTERV_GEN_ALL_ED
11-Jun-2018 00:48 Implemented All Fall with Harm Risk Interventions:  [place] to call system. Call bell, personal items and telephone within reach. Instruct patient to call for assistance. Room bathroom lighting operational. Non-slip footwear when patient is off stretcher. Physically safe environment: no spills, clutter or unnecessary equipment. Stretcher in lowest position, wheels locked, appropriate side rails in place. Provide visual cue, wrist band, yellow gown, etc. Monitor gait and stability. Monitor for mental status changes and reorient to person, place, and time. Review medications for side effects contributing to fall risk. Reinforce activity limits and safety measures with patient and family. Provide visual clues: red socks.

## 2022-01-11 NOTE — ED ADULT NURSE NOTE - CODE STROKE ACTIVATED DT
Left foot using a 3 mm currette down to and including capsular/bone layer. Healthy bleeding noted and stopped via compression. <25 cm squared of tissue removed. -Excisional debridement of superficial wound on Right foot using tissue nipper and 3 mm currette down to and including epithelial layer. Bleeding noted and stopped via compression. <25 cm2 of tissue debrided.  -Wound on the left foot dressed with tiffany, 4x8 gauze, kerlix, and Ace bandage  -Wound on the right foot dressed with TIFFANY, 4x8 gauze, kerlix and Ace bandage  -Patient to continue with written instructions for daily wound care dressing changes as above. Patient is in agreement with this. - Instructed patient to continue wearing CAM boot on the Left foot to aide in providing pressure relief to the wound on the plantar aspect of the 1st metatarsal, Left foot and to remain heel weightbearing on the Right foot in post-op shoe. Patient is in agreement with this.  -Rx 10 days doxycycline  -Plan for custom diabetic shoes and inserts in the future to prevent wound breakdown.  Patient is in agreement with this.  -Patient will return to clinic in 1 week for local wound care    Alia Nair DPM  PGY-1, Pager #: [phone]
11-Jan-2022 08:40

## 2022-01-11 NOTE — CONSULT NOTE ADULT - SUBJECTIVE AND OBJECTIVE BOX
HPI: A 76 year old male with PMH of HTN, prostate cancer and PSH of bipass sx has presented to the ED as a code stroke for AMS. Per EMS pt was confused and did not know his age, year and had unsteady gait.       ROS: A 10-system ROS was performed and is negative except for those items noted above and/or in the HPI.    PAST MEDICAL & SURGICAL HISTORY:    FAMILY HISTORY:      SOCIAL HISTORY: SOCIAL HISTORY:     Marital Status: (  )   (  ) Single  (  )   (  )      Occupation:      Lives: (  ) alone  (  ) with children   (  ) with spouse  (  ) with parents  (  ) other     Illicit Drug Use: (  ) never used  (  ) other _____     Tobacco Use:  (  ) never smoked  (  ) former smoker  (  ) current smoker  (  ) pack year  (  ) last cigarette date     Alcohol Use:      Sexual History:        MEDICATIONS  Home Medications:      MEDICATIONS  (STANDING):    MEDICATIONS  (PRN):      ALLERGIES/INTOLERANCES:  Allergies  Allergy Status Unknown    Intolerances      OBJECTIVE:  VITALS   Vital Signs Last 24 Hrs  T(C): --  T(F): --  HR: 85 (11 Jan 2022 08:59) (85 - 85)  BP: 148/78 (11 Jan 2022 08:59) (148/78 - 148/78)  BP(mean): --  RR: 16 (11 Jan 2022 08:59) (16 - 16)  SpO2: 98% (11 Jan 2022 08:59) (98% - 98%)    PHYSICAL EXAM:  Neurological Exam:  MS: Awake, alert, oriented to person, place, situation, time. Normal affect. Follows all commands.    Language: Speech is clear, fluent with good repetition & comprehension (able to name objects___)    CNs: PERRLA (R = 3mm, L = 3mm). VFF. EOMI no nystagmus, no diplopia. V1-3 intact to LT/pinprick, well developed masseter muscles b/l. No facial asymmetry b/l, Hearing grossly normal (rubbing fingers) b/l. Symmetric palate elevation in midline. Gag reflex deferred. Head turning & shoulder shrug intact b/l. Tongue midline, normal movements, no atrophy.    Fundoscopic: pale w/ sharp discs margins No vascular changes.      Motor: Normal muscle bulk & tone. No noticeable tremor or seizure. No pronator drift.              Deltoid	Biceps	Triceps 	   R	5	5	5	5	 	  L	5	5	5	5			    	H-Flex	H-Ext	K-Flex	K-Ext	D-Flex	P-Flex  R	5	5	5	5	5	5		   L	5	5	5	5	5	5		     Sensation: Intact to LT/PP/Temp/Vibration/Position b/l throughout.     Cortical: Extinction on DSS (neglect): none    Reflexes:              Biceps(C5)       BR(C6)     Triceps(C7)               Patellar(L4)    Achilles(S1)    Plantar Resp  R	2	          2	             2		        2		    2		Down   L	2	          2	             2		        2		    2		Down     Coordination: intact rapid-alt movements. No dysmetria to FTN/HTS    Gait:  Normal Romberg. No postural instability. Normal stance and tandem gait.     LABORATORY:  CBC   Chem       LFTs   Coagulopathy   Lipid Panel   A1c   Cardiac enzymes     U/A   CSF  Immunological  Other    STUDIES & IMAGING:  Studies (EKG, EEG, EMG, etc):     Radiology (XR, CT, MR, U/S, TTE/TANA): HPI: A 76 year old male with PMH of HTN, prostate cancer (diagonosed last year, unknown what he was treated with) and PSH of bipass sx has presented to the ED from home as a code stroke for AMS. Wife told EMS that pt was confused and did not know day, month, year after he was questioned and he was walking to the opposite side of what he was told. LKW was sometime last night, exact time unknown by EMS. Pt states that around 4 AM he opened his eye in bed and states that he may have had an episode of blurry vision, unsure as it was dark in the house. Pt denies any recent headache, nausea, vomiting, numbness, tingling, weakness, trauma, changes in hearing, pains, fever, chills. Pt is not a TPA candidate as he's outside window. Not a thrombectomy candidate as no LVO seen on imaging.    NIHSS 4 (no age, could not name objects- mild aphasia, R complete hemianopia)  preMRS 0      ROS: A 10-system ROS was performed and is negative except for those items noted above and/or in the HPI.    PAST MEDICAL & SURGICAL HISTORY:    FAMILY HISTORY:      SOCIAL HISTORY: SOCIAL HISTORY:     Marital Status: (  x)   (  ) Single  (  )   (  )      Occupation:      Lives: (  ) alone  (  ) with children   ( x ) with spouse  (  ) with parents  (  ) other     Illicit Drug Use: (  ) never used  (  ) other _____     Tobacco Use:  (  ) never smoked  (  ) former smoker  (  ) current smoker  (  ) pack year  (  ) last cigarette date     Alcohol Use:      Sexual History:        MEDICATIONS  Home Medications:      MEDICATIONS  (STANDING):    MEDICATIONS  (PRN):      ALLERGIES/INTOLERANCES:  Allergies  Allergy Status Unknown    Intolerances      OBJECTIVE:  VITALS   Vital Signs Last 24 Hrs  T(C): --  T(F): --  HR: 85 (11 Jan 2022 08:59) (85 - 85)  BP: 148/78 (11 Jan 2022 08:59) (148/78 - 148/78)  BP(mean): --  RR: 16 (11 Jan 2022 08:59) (16 - 16)  SpO2: 98% (11 Jan 2022 08:59) (98% - 98%)    PHYSICAL EXAM:  Neurological Exam:  MS: Awake, alert, oriented to self, place, season, month, day, not to president or year (1941). Normal affect. Follows all commands.    Language: Speech is clear, fluent with good repetition & poor comprehension (cannot name objects)    CNs: PERRLA (R = 3mm, L = 3mm).R complete hemianopia. EOMI no nystagmus, no diplopia. V1-3 intact to LT, well developed masseter muscles b/l. No facial asymmetry b/l, Hearing grossly normal (rubbing fingers) b/l. Symmetric palate elevation in midline. Gag reflex deferred. Head turning & shoulder shrug intact b/l. Tongue midline, normal movements, no atrophy.    Motor: Normal muscle bulk & tone. No noticeable tremor or seizure. No pronator drift.              Deltoid	Biceps	Triceps 	   R	5	5	5	5	 	  L	5	5	5	5			    	H-Flex	H-Ext	K-Flex	K-Ext	D-Flex	P-Flex  R	5	5	5	5	5	5		   L	5	5	5	5	5	5		    Vascular insufficiency noted in lower extremities.      Sensation: Intact to LT in all extremities    Cortical: Extinction on DSS (neglect): none    Reflexes:              Biceps(C5)       BR(C6)     Triceps(C7)               Patellar(L4)    Achilles(S1)    Plantar Resp  R	2	          2	             2		        1		    1		Down   L	2	          2	             2		        1		    1		Up    Coordination: intact rapid-alt movements. No dysmetria to FTN/HTS    Gait:  Normal Romberg. No postural instability. Normal stance and tandem gait.     LABORATORY:  CBC   Chem       LFTs   Coagulopathy   Lipid Panel   A1c   Cardiac enzymes     U/A   CSF  Immunological  Other    STUDIES & IMAGING:  Studies (EKG, EEG, EMG, etc):     Radiology (XR, CT, MR, U/S, TTE/TANA):   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 Occlusion of the distal left posterior cerebral artery on   CTA.     HPI: A 76 year old male with PMH of HTN, prostate cancer (diagonosed last year, unknown what he was treated with), on xareltoat home per EMS, and PSH of bipass sx has presented to the ED from home as a code stroke for AMS. Wife told EMS that pt was confused and did not know day, month, year after he was questioned and he was walking to the opposite side of what he was told. LKW was sometime last night, exact time unknown by EMS. Pt states that around 4 AM he opened his eye in bed and states that he may have had an episode of blurry vision, unsure as it was dark in the house. Pt denies any recent headache, nausea, vomiting, numbness, tingling, weakness, trauma, changes in hearing, pains, fever, chills. Pt is not a TPA candidate as he's outside window. Not a thrombectomy candidate as no LVO seen on imaging.    Tried to call wife but wife did not  phone.     NIHSS 4 (no age, could not name objects- mild aphasia, R complete hemianopia)  preMRS 0      ROS: A 10-system ROS was performed and is negative except for those items noted above and/or in the HPI.    PAST MEDICAL & SURGICAL HISTORY:    FAMILY HISTORY:      SOCIAL HISTORY: SOCIAL HISTORY:     Marital Status: (  x)   (  ) Single  (  )   (  )      Occupation:      Lives: (  ) alone  (  ) with children   ( x ) with spouse  (  ) with parents  (  ) other     Illicit Drug Use: (  ) never used  (  ) other _____     Tobacco Use:  (  ) never smoked  (  ) former smoker  (  ) current smoker  (  ) pack year  (  ) last cigarette date     Alcohol Use:      Sexual History:        MEDICATIONS  Home Medications:      MEDICATIONS  (STANDING):    MEDICATIONS  (PRN):      ALLERGIES/INTOLERANCES:  Allergies  Allergy Status Unknown    Intolerances      OBJECTIVE:  VITALS   Vital Signs Last 24 Hrs  T(C): --  T(F): --  HR: 85 (11 Jan 2022 08:59) (85 - 85)  BP: 148/78 (11 Jan 2022 08:59) (148/78 - 148/78)  BP(mean): --  RR: 16 (11 Jan 2022 08:59) (16 - 16)  SpO2: 98% (11 Jan 2022 08:59) (98% - 98%)    PHYSICAL EXAM:  Neurological Exam:  MS: Awake, alert, oriented to self, place, season, month, day, not to president or year (1941). Normal affect. Follows all commands.    Language: Speech is clear, fluent with good repetition & poor comprehension (cannot name objects)    CNs: PERRLA (R = 3mm, L = 3mm).R complete hemianopia. EOMI no nystagmus, no diplopia. V1-3 intact to LT, well developed masseter muscles b/l. No facial asymmetry b/l, Hearing grossly normal (rubbing fingers) b/l. Symmetric palate elevation in midline. Gag reflex deferred. Head turning & shoulder shrug intact b/l. Tongue midline, normal movements, no atrophy.    Motor: Normal muscle bulk & tone. No noticeable tremor or seizure. No pronator drift.              Deltoid	Biceps	Triceps 	   R	5	5	5	5	 	  L	5	5	5	5			    	H-Flex	H-Ext	K-Flex	K-Ext	D-Flex	P-Flex  R	5	5	5	5	5	5		   L	5	5	5	5	5	5		    Vascular insufficiency noted in lower extremities.      Sensation: Intact to LT in all extremities    Cortical: Extinction on DSS (neglect): none    Reflexes:              Biceps(C5)       BR(C6)     Triceps(C7)               Patellar(L4)    Achilles(S1)    Plantar Resp  R	2	          2	             2		        1		    1		Down   L	2	          2	             2		        1		    1		Up    Coordination: intact rapid-alt movements. No dysmetria to FTN/HTS    Gait:  Normal Romberg. No postural instability. Normal stance and tandem gait.     LABORATORY:  CBC   Chem       LFTs   Coagulopathy   Lipid Panel   A1c   Cardiac enzymes     U/A   CSF  Immunological  Other    STUDIES & IMAGING:  Studies (EKG, EEG, EMG, etc):     Radiology (XR, CT, MR, U/S, TTE/TANA):   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 Occlusion of the distal left posterior cerebral artery on   CTA.     HPI: A 76 year old male with PMH of HTN, prostate cancer (diagonosed last year, unknown what he was treated with), on xareltoat home per EMS, and PSH of biphira sx has presented to the ED from home as a code stroke for AMS. Wife told EMS that pt was confused and did not know day, month, year after he was questioned and he was walking to the opposite side of what he was told. LKW was sometime last night, exact time unknown by EMS. Pt states that around 4 AM he opened his eye in bed and states that he may have had an episode of blurry vision, unsure as it was dark in the house. Pt denies any recent headache, nausea, vomiting, numbness, tingling, weakness, trauma, changes in hearing, pains, fever, chills. Pt is not a TPA candidate as he's outside window. Not a thrombectomy candidate as no LVO seen on imaging.   BP on scene 148/84 glucose 164  Tried to call wife but wife did not  phone.     NIHSS 4 (no age, could not name objects- mild aphasia, R complete hemianopia)  preMRS 0      ROS: A 10-system ROS was performed and is negative except for those items noted above and/or in the HPI.    PAST MEDICAL & SURGICAL HISTORY:    FAMILY HISTORY:      SOCIAL HISTORY: SOCIAL HISTORY:     Marital Status: (  x)   (  ) Single  (  )   (  )      Occupation:      Lives: (  ) alone  (  ) with children   ( x ) with spouse  (  ) with parents  (  ) other     Illicit Drug Use: (  ) never used  (  ) other _____     Tobacco Use:  (  ) never smoked  (  ) former smoker  (  ) current smoker  (  ) pack year  (  ) last cigarette date     Alcohol Use:      Sexual History:        MEDICATIONS  Home Medications:      MEDICATIONS  (STANDING):    MEDICATIONS  (PRN):      ALLERGIES/INTOLERANCES:  Allergies  Allergy Status Unknown    Intolerances      OBJECTIVE:  VITALS   Vital Signs Last 24 Hrs  T(C): --  T(F): --  HR: 85 (11 Jan 2022 08:59) (85 - 85)  BP: 148/78 (11 Jan 2022 08:59) (148/78 - 148/78)  BP(mean): --  RR: 16 (11 Jan 2022 08:59) (16 - 16)  SpO2: 98% (11 Jan 2022 08:59) (98% - 98%)    PHYSICAL EXAM:  Neurological Exam:  MS: Awake, alert, oriented to self, place, season, month, day, not to president or year (1941). Normal affect. Follows all commands.    Language: Speech is clear, fluent with good repetition & poor comprehension (cannot name objects)    CNs: PERRLA (R = 3mm, L = 3mm).R complete hemianopia. EOMI no nystagmus, no diplopia. V1-3 intact to LT, well developed masseter muscles b/l. No facial asymmetry b/l, Hearing grossly normal (rubbing fingers) b/l. Symmetric palate elevation in midline. Gag reflex deferred. Head turning & shoulder shrug intact b/l. Tongue midline, normal movements, no atrophy.    Motor: Normal muscle bulk & tone. No noticeable tremor or seizure. No pronator drift.              Deltoid	Biceps	Triceps 	   R	5	5	5	5	 	  L	5	5	5	5			    	H-Flex	H-Ext	K-Flex	K-Ext	D-Flex	P-Flex  R	5	5	5	5	5	5		   L	5	5	5	5	5	5		    Vascular insufficiency noted in lower extremities.      Sensation: Intact to LT in all extremities    Cortical: Extinction on DSS (neglect): none    Reflexes:              Biceps(C5)       BR(C6)     Triceps(C7)               Patellar(L4)    Achilles(S1)    Plantar Resp  R	2	          2	             2		        1		    1		Down   L	2	          2	             2		        1		    1		Up    Coordination: intact rapid-alt movements. No dysmetria to FTN/HTS    Gait:  Normal Romberg. No postural instability. Normal stance and tandem gait.     LABORATORY:  CBC   Chem       LFTs   Coagulopathy   Lipid Panel   A1c   Cardiac enzymes     U/A   CSF  Immunological  Other    STUDIES & IMAGING:  Studies (EKG, EEG, EMG, etc):     Radiology (XR, CT, MR, U/S, TTE/TANA):   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 Occlusion of the distal left posterior cerebral artery on   CTA.     HPI: A 76 year old male with PMH of HTN, prostate cancer (diagonosed last year, unknown what he was treated with), recent DVT diagnosis requiring thrombectomy on xarelto, and PSH of bipass sx has presented to the ED from home as a code stroke for AMS. Wife told EMS that pt was confused since last night and did not know day, month, year after he was questioned and he was walking to the opposite side of what he was told. LKW was sometime last night, exact time unknown by EMS. Pt states that around 4 AM he opened his eye in bed and states that he may have had an episode of blurry vision, unsure as it was dark in the house. Pt denies any recent headache, nausea, vomiting, numbness, tingling, weakness, trauma, changes in hearing, pains, fever, chills. Pt is not a TPA candidate as he's outside window. Not a thrombectomy candidate as no LVO seen on imaging.   BP on scene 148/84 glucose 164  Tried to call wife but wife did not  phone.     NIHSS 4 (no age, could not name objects- mild aphasia, R complete hemianopia)  preMRS 0      ROS: A 10-system ROS was performed and is negative except for those items noted above and/or in the HPI.    PAST MEDICAL & SURGICAL HISTORY:    FAMILY HISTORY:      SOCIAL HISTORY: SOCIAL HISTORY:     Marital Status: (  x)   (  ) Single  (  )   (  )      Occupation:      Lives: (  ) alone  (  ) with children   ( x ) with spouse  (  ) with parents  (  ) other     Illicit Drug Use: (  ) never used  (  ) other _____     Tobacco Use:  (  ) never smoked  (  ) former smoker  (  ) current smoker  (  ) pack year  (  ) last cigarette date     Alcohol Use:      Sexual History:        MEDICATIONS  Home Medications:      MEDICATIONS  (STANDING):    MEDICATIONS  (PRN):      ALLERGIES/INTOLERANCES:  Allergies  Allergy Status Unknown    Intolerances      OBJECTIVE:  VITALS   Vital Signs Last 24 Hrs  T(C): --  T(F): --  HR: 85 (11 Jan 2022 08:59) (85 - 85)  BP: 148/78 (11 Jan 2022 08:59) (148/78 - 148/78)  BP(mean): --  RR: 16 (11 Jan 2022 08:59) (16 - 16)  SpO2: 98% (11 Jan 2022 08:59) (98% - 98%)    PHYSICAL EXAM:  Neurological Exam:  MS: Awake, alert, oriented to self, place, season, month, day, not to president or year (1941). Normal affect. Follows all commands.    Language: Speech is clear, fluent with good repetition & poor comprehension (cannot name objects)    CNs: PERRLA (R = 3mm, L = 3mm).R complete hemianopia. EOMI no nystagmus, no diplopia. V1-3 intact to LT, well developed masseter muscles b/l. No facial asymmetry b/l, Hearing grossly normal (rubbing fingers) b/l. Symmetric palate elevation in midline. Gag reflex deferred. Head turning & shoulder shrug intact b/l. Tongue midline, normal movements, no atrophy.    Motor: Normal muscle bulk & tone. No noticeable tremor or seizure. No pronator drift.              Deltoid	Biceps	Triceps 	   R	5	5	5	5	 	  L	5	5	5	5			    	H-Flex	H-Ext	K-Flex	K-Ext	D-Flex	P-Flex  R	5	5	5	5	5	5		   L	5	5	5	5	5	5		    Vascular insufficiency noted in lower extremities.      Sensation: Intact to LT in all extremities    Cortical: Extinction on DSS (neglect): none    Reflexes:              Biceps(C5)       BR(C6)     Triceps(C7)               Patellar(L4)    Achilles(S1)    Plantar Resp  R	2	          2	             2		        1		    1		Down   L	2	          2	             2		        1		    1		Up    Coordination: intact rapid-alt movements. No dysmetria to FTN/HTS    Gait:  Normal Romberg. No postural instability. Normal stance and tandem gait.     LABORATORY:  CBC   Chem       LFTs   Coagulopathy   Lipid Panel   A1c   Cardiac enzymes     U/A   CSF  Immunological  Other    STUDIES & IMAGING:  Studies (EKG, EEG, EMG, etc):     Radiology (XR, CT, MR, U/S, TTE/TANA):   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 Occlusion of the distal left posterior cerebral artery on   CTA.     HPI: A 76 year old male poor historian with PMH of HTN, prostate cancer (diagonosed last year, unknown what he was treated with), recent DVT diagnosis requiring thrombectomy on xarelto, and PSH of bipass sx has presented to the ED from home as a code stroke for AMS. Wife told EMS that pt was confused since last night and did not know day, month, year after he was questioned and he was walking to the opposite side of what he was told. LKW was sometime last night, exact time unknown by EMS. Pt states that around 4 AM he opened his eye in bed and states that he may have had an episode of blurry vision, unsure as it was dark in the house. Pt denies any recent headache, nausea, vomiting, numbness, tingling, weakness, trauma, changes in hearing, pains, fever, chills. Pt is not a TPA candidate as he's outside window. Not a thrombectomy candidate as no LVO seen on imaging.   BP on scene 148/84 glucose 164  Tried to call wife but wife did not  phone.     NIHSS 4 (no age, could not name objects- mild aphasia, R complete hemianopia)  preMRS 0      ROS: A 10-system ROS was performed and is negative except for those items noted above and/or in the HPI.    PAST MEDICAL & SURGICAL HISTORY:    FAMILY HISTORY:      SOCIAL HISTORY: SOCIAL HISTORY:     Marital Status: (  x)   (  ) Single  (  )   (  )      Occupation:      Lives: (  ) alone  (  ) with children   ( x ) with spouse  (  ) with parents  (  ) other     Illicit Drug Use: (  ) never used  (  ) other _____     Tobacco Use:  (  ) never smoked  (  ) former smoker  (  ) current smoker  (  ) pack year  (  ) last cigarette date     Alcohol Use:      Sexual History:        MEDICATIONS  Home Medications:      MEDICATIONS  (STANDING):    MEDICATIONS  (PRN):      ALLERGIES/INTOLERANCES:  Allergies  Allergy Status Unknown    Intolerances      OBJECTIVE:  VITALS   Vital Signs Last 24 Hrs  T(C): --  T(F): --  HR: 85 (11 Jan 2022 08:59) (85 - 85)  BP: 148/78 (11 Jan 2022 08:59) (148/78 - 148/78)  BP(mean): --  RR: 16 (11 Jan 2022 08:59) (16 - 16)  SpO2: 98% (11 Jan 2022 08:59) (98% - 98%)    PHYSICAL EXAM:  Neurological Exam:  MS: Awake, alert, oriented to self, place, season, month, day, not to president or year (1941). Normal affect. Follows all commands.    Language: Speech is clear, fluent with good repetition & poor comprehension (cannot name objects)    CNs: PERRLA (R = 3mm, L = 3mm).R complete hemianopia. EOMI no nystagmus, no diplopia. V1-3 intact to LT, well developed masseter muscles b/l. No facial asymmetry b/l, Hearing grossly normal (rubbing fingers) b/l. Symmetric palate elevation in midline. Gag reflex deferred. Head turning & shoulder shrug intact b/l. Tongue midline, normal movements, no atrophy.    Motor: Normal muscle bulk & tone. No noticeable tremor or seizure. No pronator drift.              Deltoid	Biceps	Triceps 	   R	5	5	5	5	 	  L	5	5	5	5			    	H-Flex	H-Ext	K-Flex	K-Ext	D-Flex	P-Flex  R	5	5	5	5	5	5		   L	5	5	5	5	5	5		    Vascular insufficiency noted in lower extremities.      Sensation: Intact to LT in all extremities    Cortical: Extinction on DSS (neglect): none    Reflexes:              Biceps(C5)       BR(C6)     Triceps(C7)               Patellar(L4)    Achilles(S1)    Plantar Resp  R	2	          2	             2		        1		    1		Down   L	2	          2	             2		        1		    1		Up    Coordination: intact rapid-alt movements. No dysmetria to FTN/HTS    Gait:  Normal Romberg. No postural instability. Normal stance and tandem gait.     LABORATORY:  CBC   Chem       LFTs   Coagulopathy   Lipid Panel   A1c   Cardiac enzymes     U/A   CSF  Immunological  Other    STUDIES & IMAGING:  Studies (EKG, EEG, EMG, etc):     Radiology (XR, CT, MR, U/S, TTE/TANA):   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 Occlusion of the distal left posterior cerebral artery on   CTA.     A 76 year old male poor historian with PMH of HTN, prostate cancer diagnosed 6/2019 s/p prostectemy, hepatic carcinoma dx Nov 2019 on chemotherapy, recent DVT diagnosis requiring thrombectomy on xarelto, and PSH of bipass sx has presented to the ED from home as a code stroke for AMS. Wife told EMS that pt has been confused  since last night and did not know day, month, year after he was questioned and he was walking to the opposite side of what he was told. LKW was sometime last night, exact time unknown by EMS. Pt states that around 4 AM he opened his eye in bed and states that he may have had an episode of blurry vision, unsure as it was dark in the house. Pt denies any recent headache, nausea, vomiting, numbness, tingling, weakness, trauma, changes in hearing, pains, fever, chills. Pt is not a TPA candidate as he's outside window. Not a thrombectomy candidate as no LVO seen on imaging. Pt states that he does not take any medications at home.    BP on scene 148/84 glucose 164    NIHSS 4 (no age, could not name objects- mild aphasia, R complete hemianopia)  preMRS 0      ROS: A 10-system ROS was performed and is negative except for those items noted above and/or in the HPI.    PAST MEDICAL & SURGICAL HISTORY:    FAMILY HISTORY:      SOCIAL HISTORY: SOCIAL HISTORY:     Marital Status: (  x)   (  ) Single  (  )   (  )      Occupation:      Lives: (  ) alone  (  ) with children   ( x ) with spouse  (  ) with parents  (  ) other     Illicit Drug Use: (  ) never used  (  ) other _____     Tobacco Use:  (  ) never smoked  (  ) former smoker  (  ) current smoker  (  ) pack year  (  ) last cigarette date     Alcohol Use:      Sexual History:        MEDICATIONS  Home Medications:      MEDICATIONS  (STANDING):    MEDICATIONS  (PRN):      ALLERGIES/INTOLERANCES:  Allergies  Allergy Status Unknown    Intolerances      OBJECTIVE:  VITALS   Vital Signs Last 24 Hrs  T(C): --  T(F): --  HR: 85 (11 Jan 2022 08:59) (85 - 85)  BP: 148/78 (11 Jan 2022 08:59) (148/78 - 148/78)  BP(mean): --  RR: 16 (11 Jan 2022 08:59) (16 - 16)  SpO2: 98% (11 Jan 2022 08:59) (98% - 98%)    PHYSICAL EXAM:  Neurological Exam:  MS: Awake, alert, oriented to self, place, season, month, day, not to president or year (1941). Normal affect. Follows all commands. Cannot spell world backwards. Cannot do substractions.     Language: Speech is clear, fluent with good repetition & poor comprehension (cannot name objects)    CNs: PERRLA (R = 3mm, L = 3mm).R complete hemianopia. EOMI no nystagmus, no diplopia. V1-3 intact to LT, well developed masseter muscles b/l. No facial asymmetry b/l, Hearing grossly normal (rubbing fingers) b/l. Symmetric palate elevation in midline. Gag reflex deferred. Head turning & shoulder shrug intact b/l. Tongue midline, normal movements, no atrophy.    Motor: Normal muscle bulk & tone. No noticeable tremor or seizure. No pronator drift.              Deltoid	Biceps	Triceps 	   R	5	5	5	5	 	  L	5	5	5	5			    	H-Flex	H-Ext	K-Flex	K-Ext	D-Flex	P-Flex  R	5	5	5	5	5	5		   L	5	5	5	5	5	5		    Vascular insufficiency noted in lower extremities.      Sensation: Intact to LT in all extremities    Cortical: Extinction on DSS (neglect): none    Reflexes:              Biceps(C5)       BR(C6)     Triceps(C7)               Patellar(L4)    Achilles(S1)    Plantar Resp  R	2	          2	             2		        1		    1		Down   L	2	          2	             2		        1		    1		Up    Coordination: intact rapid-alt movements. No dysmetria to FTN/HTS    Gait:  Normal Romberg. No postural instability. Normal stance and tandem gait.     LABORATORY:  CBC   Chem       LFTs   Coagulopathy   Lipid Panel   A1c   Cardiac enzymes     U/A   CSF  Immunological  Other    STUDIES & IMAGING:  Studies (EKG, EEG, EMG, etc):     Radiology (XR, CT, MR, U/S, TTE/TANA):   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 Occlusion of the distal left posterior cerebral artery on   CTA.

## 2022-01-11 NOTE — ED PROVIDER NOTE - OBJECTIVE STATEMENT
76yr M hx of prior strokes, prostate CA on chemo, recent DVT diagnosis requiring thrombectomy , on xarelto p/w AMS. per wife, is being confused increasingly since last night. continued to mistake places at home and complaining of not being able to see the TV however was looking at other objects.   wife denies fever chills, n/v, abd pain, urinary sx, cough, sorethroat, sick contacts.  is covid vax and boosted  at baseline, is alert and oriented and is functional and ambulatory  presented as stroke alert

## 2022-01-11 NOTE — ED PROVIDER NOTE - PHYSICAL EXAMINATION
alert and awake, aaox2, no distress  left eye twitching, mild erythema of eyelids (per wife not new)  neuro exam otherwise non focal  clear lungs  S1-2  soft non tender abd, midline scar  has chronic changes in both legs, pitting edema

## 2022-01-11 NOTE — CONSULT NOTE ADULT - SUBJECTIVE AND OBJECTIVE BOX
CHIEF COMPLAINT: altered mental status    HISTORY OF PRESENT ILLNESS:  A 76 year old male poor historian with PMH of HTN, prostate cancer diagnosed 6/2019 s/p prostectemy, hepatic carcinoma dx Nov 2019 on chemotherapy, recent DVT diagnosis requiring thrombectomy on xarelto, and PSH of bipass sx has presented to the ED from home as a code stroke for AMS. Wife told EMS that pt has been confused  since last night and did not know day, month, year after he was questioned and he was walking to the opposite side of what he was told. LKW was sometime last night, exact time unknown by EMS. Pt states that around 4 AM he opened his eye in bed and states that he may have had an episode of blurry vision, unsure as it was dark in the house. Pt denies any recent headache, nausea, vomiting, numbness, tingling, weakness, trauma, changes in hearing, pains, fever, chills. Pt is not a TPA candidate as he's outside window. Not a thrombectomy candidate as no LVO seen on imaging. Pt states that he does not take any medications at home.   denies chest pain, shortness of breath, difficulty breathing.     PAST MEDICAL & SURGICAL HISTORY:          MEDICATIONS:                  FAMILY HISTORY:      SOCIAL HISTORY:    [ ] Non-smoker  [ ] Smoker  [ ] Alcohol    Allergies    Allergy Status Unknown    Intolerances    	    REVIEW OF SYSTEMS:  CONSTITUTIONAL:  +weakness, no fever, weight loss, or fatigue  EYES: difficulty leaving left eye open, no visual disturbances, no discharge  ENMT:  No difficulty hearing, tinnitus, vertigo; No sinus or throat pain  NECK: No pain or stiffness  RESPIRATORY: No cough, wheezing, chills or hemoptysis; No Shortness of Breath  CARDIOVASCULAR: No chest pain, palpitations, passing out, dizziness, or leg swelling  GASTROINTESTINAL: No abdominal or epigastric pain. No nausea, vomiting, or hematemesis; No diarrhea or constipation. No melena or hematochezia.  GENITOURINARY: No dysuria, frequency, hematuria, or incontinence  NEUROLOGICAL: No headaches, memory loss, loss of strength, numbness, or tremors  SKIN: No itching, burning, rashes, or lesions   LYMPH Nodes: No enlarged glands  ENDOCRINE: No heat or cold intolerance; No hair loss  MUSCULOSKELETAL: No joint pain or swelling; No muscle, back, or extremity pain  PSYCHIATRIC: No depression, anxiety, mood swings, or difficulty sleeping  HEME/LYMPH: No easy bruising, or bleeding gums  ALLERY AND IMMUNOLOGIC: No hives or eczema	    [ ] All others negative	  [ ] Unable to obtain    PHYSICAL EXAM:  T(C): 36.9 (01-11-22 @ 15:13), Max: 36.9 (01-11-22 @ 09:07)  HR: 75 (01-11-22 @ 15:13) (75 - 89)  BP: 119/69 (01-11-22 @ 15:13) (119/69 - 148/78)  RR: 18 (01-11-22 @ 15:13) (16 - 19)  SpO2: 97% (01-11-22 @ 15:13) (97% - 100%)  Wt(kg): --  I&O's Summary      Appearance: Normal, NAD	  HEENT:   Normal oral mucosa, PERRL, EOMI	  Lymphatic: No lymphadenopathy  Cardiovascular: Normal S1 S2, No JVD, No murmurs, No edema  Respiratory: Lungs clear to auscultation	  Psychiatry: A & O x 3 with some forgetfulness, Mood & affect appropriate  Gastrointestinal:  Soft, Non-tender, + BS	  Skin: No rashes, No ecchymoses, No cyanosis	  Neurologic: Non-focal  Extremities: Normal range of motion, No clubbing, cyanosis or edema  Vascular: Peripheral pulses palpable 2+ bilaterally    TELEMETRY: 	      ECG:  	    RADIOLOGY:  < from: Xray Chest 1 View- PORTABLE-Urgent (01.11.22 @ 09:39) >  ACC: [id number] EXAM:  XR CHEST PORTABLE URGENT 1V                          PROCEDURE DATE:  01/11/2022          INTERPRETATION:  TIME OF EXAM: January 11, 2022 at 9:33 AM.    CLINICAL INFORMATION: Stroke code.    COMPARISON:  None available    TECHNIQUE:   AP Portable chest x-ray. Lateral bases excluded from image.    INTERPRETATION:    Heart size and the mediastinum cannot be accurately evaluated on this   projection. Calcified thoracic aorta.  CT injectable right IJ approach port with tip in the SVC.  The included lungs are clear.  No pleural effusion or pneumothorax is seen.  There is osteoarthritic degenerative change of the spine.        IMPRESSION:  The included lungs are clear.    < end of copied text >  < from: CT Angio Neck w/ IV Cont (01.11.22 @ 09:15) >  ACC: [id number] EXAM:  CT BRAIN STROKE PROTOCOL                        ACC: [id number] EXAM:  CT ANGIO BRAIN (W)AW IC                        ACC: [id number] EXAM:  CT ANGIO NECK (W)AW IC                          PROCEDURE DATE:  01/11/2022          INTERPRETATION:  Clinical Indication: Code stroke for altered mental   status, dizziness, unsteady gait, prostate cancer    5mm axial sections of the brain were obtained from base to vertex,   without the intravenous administration of contrast material. Coronal and   sagittal computer generated reconstructed are available.    No prior brain imaging is available for comparison.          The ventricles and sulci are prominent consistent age appropriate   involutional changes. Small vessel white matter ischemic changes are   noted. There is no hemorrhage. There is an old right occipital infarct.   Lucency in the left medial temporal occipital region is identified which   has the appearance of a subacute left posterior cerebral artery infarct   with sulcal effacement.        After the intravenous power injection of 70 cc of Omnipaque 300 using a   bolus munira timing run serial thin sections were obtained through the   neck from the thoracic inlet through the intracranial circulation   centered at the [id number] on a multislice CT scanner reformatted   with coronal and sagittal 2 D-MIP projections, including 3 D   reconstructions using a separate 3D [employer] software workstation. A total   of 70  cc of Omnipaque were intravenously injected. 30  cc were discarded.    The origins of the carotid vertebral arteries are normal. The vertebral   arteries are codominant. The carotid bifurcations are unremarkable.    The distal vertebral arteries are well identified as are the   posterior-inferior cerebellar arteries bilaterally. The region of the   vertebral basilar junction is normal. The basilar artery is normal. The   right posterior cerebral and superior cerebellar arteries are normal.   There is occlusion of the distal left posterior cerebral artery.    Evaluation of the carotid arteries demonstrate normal appearance to the   distal cervical, petrous cavernous and supraclinoid internal carotid   arteries. The anterior cerebral arteries anterior communicating artery   and middle cerebral arteries are normal.      The normal intracranial venous circulation is identified. The transverse   sinuses are codominant. The superior sagittal sinus, internal cerebral   veins, vein of Shay, straight sinus, transverse sinuses, sigmoid sinuses   and internal jugular veins are normal. Cortical veins are normal.    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 Occlusion of the distal left posterior cerebral artery on   CTA.    < end of copied text >  < from: CT Angio Head w/ IV Cont (01.11.22 @ 09:15) >  ACC: [id number] EXAM:  CT BRAIN STROKE PROTOCOL                        ACC: [id number] EXAM:  CT ANGIO BRAIN (W)AW IC                        ACC: [id number] EXAM:  CT ANGIO NECK (W)[facility]                          PROCEDURE DATE:  01/11/2022          INTERPRETATION:  Clinical Indication: Code stroke for altered mental   status, dizziness, unsteady gait, prostate cancer    5mm axial sections of the brain were obtained from base to vertex,   without the intravenous administration of contrast material. Coronal and   sagittal computer generated reconstructed are available.    No prior brain imaging is available for comparison.          The ventricles and sulci are prominent consistent age appropriate   involutional changes. Small vessel white matter ischemic changes are   noted. There is no hemorrhage. There is an old right occipital infarct.   Lucency in the left medial temporal occipital region is identified which   has the appearance of a subacute left posterior cerebral artery infarct   with sulcal effacement.        After the intravenous power injection of 70 cc of Omnipaque 300 using a   bolus munira timing run serial thin sections were obtained through the   neck from the thoracic inlet through the intracranial circulation   centered at the tsyvtl-uv-Pnrzyo on a multislice CT scanner reformatted   with coronal and sagittal 2 D-MIP projections, including 3 D   reconstructions using a separate 3D Files[employer] software workstation. A total   of 70  cc of Omnipaque were intravenously injected. 30  cc were discarded.    The origins of the carotid vertebral arteries are normal. The vertebral   arteries are codominant. The carotid bifurcations are unremarkable.    The distal vertebral arteries are well identified as are the   posterior-inferior cerebellar arteries bilaterally. The region of the   vertebral basilar junction is normal. The basilar artery is normal. The   right posterior cerebral and superior cerebellar arteries are normal.   There is occlusion of the distal left posterior cerebral artery.    Evaluation of the carotid arteries demonstrate normal appearance to the   distal cervical, petrous cavernous and supraclinoid internal carotid   arteries. The anterior cerebral arteries anterior communicating artery   and middle cerebral arteries are normal.      The normal intracranial venous circulation is identified. The transverse   sinuses are codominant. The superior sagittal sinus, internal cerebral   veins, vein of Shay, straight sinus, transverse sinuses, sigmoid sinuses   and internal jugular veins are normal. Cortical veins are normal.    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 Occlusion of the distal left posterior cerebral artery on   CTA.    < end of copied text >  < from: CT Brain Stroke Protocol (01.11.22 @ 09:15) >  ACC: [id number] EXAM:  CT BRAIN STROKE PROTOCOL                        ACC: [id number] EXAM:  CT ANGIO BRAIN (W)AW IC                        ACC: [id number] EXAM:  CT ANGIO NECK (W)AW IC                          PROCEDURE DATE:  01/11/2022          INTERPRETATION:  Clinical Indication: Code stroke for altered mental   status, dizziness, unsteady gait, prostate cancer    5mm axial sections of the brain were obtained from base to vertex,   without the intravenous administration of contrast material. Coronal and   sagittal computer generated reconstructed are available.    No prior brain imaging is available for comparison.          The ventricles and sulci are prominent consistent age appropriate   involutional changes. Small vessel white matter ischemic changes are   noted. There is no hemorrhage. There is an old right occipital infarct.   Lucency in the left medial temporal occipital region is identified which   has the appearance of a subacute left posterior cerebral artery infarct   with sulcal effacement.        After the intravenous power injection of 70 cc of Omnipaque 300 using a   bolus munira timing run serial thin sections were obtained through the   neck from the thoracic inlet through the intracranial circulation   centered at the iuiljt-ti-Dpxdgl on a multislice CT scanner reformatted   with coronal and sagittal 2 D-MIP projections, including 3 D   reconstructions using a separate 3D FilesXa software workstation. A total   of 70  cc of Omnipaque were intravenously injected. 30  cc were discarded.    The origins of the carotid vertebral arteries are normal. The vertebral   arteries are codominant. The carotid bifurcations are unremarkable.    The distal vertebral arteries are well identified as are the   posterior-inferior cerebellar arteries bilaterally. The region of the   vertebral basilar junction is normal. The basilar artery is normal. The   right posterior cerebral and superior cerebellar arteries are normal.   There is occlusion of the distal left posterior cerebral artery.    Evaluation of the carotid arteries demonstrate normal appearance to the   distal cervical, petrous cavernous and supraclinoid internal carotid   arteries. The anterior cerebral arteries anterior communicating artery   and middle cerebral arteries are normal.      The normal intracranial venous circulation is identified. The transverse   sinuses are codominant. The superior sagittal sinus, internal cerebral   veins, vein of Shay, straight sinus, transverse sinuses, sigmoid sinuses   and internal jugular veins are normal. Cortical veins are normal.    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 Occlusion of the distal left posterior cerebral artery on   CTA.    < end of copied text >    OTHER: 	  	  LABS:	 	    CARDIAC MARKERS:   Troponin T, High Sensitivity Result: 38: Specimen not hemolyzed                                   9.8    8.66  )-----------( 48       ( 11 Jan 2022 09:06 )             30.5     01-11    138  |  106  |  19  ----------------------------<  196<H>  4.2   |  22  |  0.80    Ca    8.3<L>      11 Jan 2022 09:06    TPro  5.1<L>  /  Alb  2.7<L>  /  TBili  1.2  /  DBili  x   /  AST  23  /  ALT  14  /  AlkPhos  156<H>  01-11    proBNP:   Lipid Profile:   HgA1c:   TSH:            CHIEF COMPLAINT: altered mental status    HISTORY OF PRESENT ILLNESS:  A 76 year old male poor historian with PMH of HTN, prostate cancer diagnosed 6/2019 s/p prostectemy, hepatic carcinoma dx Nov 2019 on chemotherapy, recent DVT diagnosis requiring thrombectomy on xarelto, and PSH of bipass sx has presented to the ED from home as a code stroke for AMS. Wife told EMS that pt has been confused  since last night and did not know day, month, year after he was questioned and he was walking to the opposite side of what he was told. LKW was sometime last night, exact time unknown by EMS. Pt states that around 4 AM he opened his eye in bed and states that he may have had an episode of blurry vision, unsure as it was dark in the house. Pt denies any recent headache, nausea, vomiting, numbness, tingling, weakness, trauma, changes in hearing, pains, fever, chills. Pt is not a TPA candidate as he's outside window. Not a thrombectomy candidate as no LVO seen on imaging. Pt states that he does not take any medications at home.   denies chest pain, shortness of breath, difficulty breathing.     PAST MEDICAL & SURGICAL HISTORY:          MEDICATIONS:                  FAMILY HISTORY:      SOCIAL HISTORY:    [ ] Non-smoker  [ ] Smoker  [ ] Alcohol    Allergies    Allergy Status Unknown    Intolerances    	    REVIEW OF SYSTEMS:  CONSTITUTIONAL:  +weakness, no fever, weight loss, or fatigue  EYES: difficulty leaving left eye open, no visual disturbances, no discharge  ENMT:  No difficulty hearing, tinnitus, vertigo; No sinus or throat pain  NECK: No pain or stiffness  RESPIRATORY: No cough, wheezing, chills or hemoptysis; No Shortness of Breath  CARDIOVASCULAR: No chest pain, palpitations, passing out, dizziness, or leg swelling  GASTROINTESTINAL: No abdominal or epigastric pain. No nausea, vomiting, or hematemesis; No diarrhea or constipation. No melena or hematochezia.  GENITOURINARY: No dysuria, frequency, hematuria, or incontinence  NEUROLOGICAL: No headaches, memory loss, loss of strength, numbness, or tremors  SKIN: No itching, burning, rashes, or lesions   LYMPH Nodes: No enlarged glands  ENDOCRINE: No heat or cold intolerance; No hair loss  MUSCULOSKELETAL: No joint pain or swelling; No muscle, back, or extremity pain  PSYCHIATRIC: No depression, anxiety, mood swings, or difficulty sleeping  HEME/LYMPH: No easy bruising, or bleeding gums  ALLERY AND IMMUNOLOGIC: No hives or eczema	    [ ] All others negative	  [ ] Unable to obtain    PHYSICAL EXAM:  T(C): 36.9 (01-11-22 @ 15:13), Max: 36.9 (01-11-22 @ 09:07)  HR: 75 (01-11-22 @ 15:13) (75 - 89)  BP: 119/69 (01-11-22 @ 15:13) (119/69 - 148/78)  RR: 18 (01-11-22 @ 15:13) (16 - 19)  SpO2: 97% (01-11-22 @ 15:13) (97% - 100%)  Wt(kg): --  I&O's Summary      Appearance: Normal, NAD	  HEENT:   Normal oral mucosa, PERRL, EOMI	  Lymphatic: No lymphadenopathy  Cardiovascular: Normal S1 S2, No JVD, No murmurs, No edema  Respiratory: Lungs clear to auscultation	  Psychiatry: A & O x 3 with some forgetfulness, Mood & affect appropriate  Gastrointestinal:  Soft, Non-tender, + BS	  Skin: No rashes, No ecchymoses, No cyanosis	  Neurologic: Non-focal  Extremities: + edema BLE, Normal range of motion, No clubbing, cyanosis  Vascular: Peripheral pulses palpable 2+ bilaterally    TELEMETRY: 	      ECG:  	    RADIOLOGY:  < from: Xray Chest 1 View- PORTABLE-Urgent (01.11.22 @ 09:39) >  ACC: [id number] EXAM:  XR CHEST PORTABLE URGENT 1V                          PROCEDURE DATE:  01/11/2022          INTERPRETATION:  TIME OF EXAM: January 11, 2022 at 9:33 AM.    CLINICAL INFORMATION: Stroke code.    COMPARISON:  None available    TECHNIQUE:   AP Portable chest x-ray. Lateral bases excluded from image.    INTERPRETATION:    Heart size and the mediastinum cannot be accurately evaluated on this   projection. Calcified thoracic aorta.  CT injectable right IJ approach port with tip in the SVC.  The included lungs are clear.  No pleural effusion or pneumothorax is seen.  There is osteoarthritic degenerative change of the spine.        IMPRESSION:  The included lungs are clear.    < end of copied text >  < from: CT Angio Neck w/ IV Cont (01.11.22 @ 09:15) >  ACC: [id number] EXAM:  CT BRAIN STROKE PROTOCOL                        ACC: [id number] EXAM:  CT ANGIO BRAIN (W)AW IC                        ACC: [id number] EXAM:  CT ANGIO NECK (W) IC                          PROCEDURE DATE:  01/11/2022          INTERPRETATION:  Clinical Indication: Code stroke for altered mental   status, dizziness, unsteady gait, prostate cancer    5mm axial sections of the brain were obtained from base to vertex,   without the intravenous administration of contrast material. Coronal and   sagittal computer generated reconstructed are available.    No prior brain imaging is available for comparison.          The ventricles and sulci are prominent consistent age appropriate   involutional changes. Small vessel white matter ischemic changes are   noted. There is no hemorrhage. There is an old right occipital infarct.   Lucency in the left medial temporal occipital region is identified which   has the appearance of a subacute left posterior cerebral artery infarct   with sulcal effacement.        After the intravenous power injection of 70 cc of Omnipaque 300 using a   bolus munira timing run serial thin sections were obtained through the   neck from the thoracic inlet through the intracranial circulation   centered at the [id number]-[id number]-Rqsvps on a multislice CT scanner reformatted   with coronal and sagittal 2 D-MIP projections, including 3 D   reconstructions using a separate 3D [employer] software workstation. A total   of 70  cc of Omnipaque were intravenously injected. 30  cc were discarded.    The origins of the carotid vertebral arteries are normal. The vertebral   arteries are codominant. The carotid bifurcations are unremarkable.    The distal vertebral arteries are well identified as are the   posterior-inferior cerebellar arteries bilaterally. The region of the   vertebral basilar junction is normal. The basilar artery is normal. The   right posterior cerebral and superior cerebellar arteries are normal.   There is occlusion of the distal left posterior cerebral artery.    Evaluation of the carotid arteries demonstrate normal appearance to the   distal cervical, petrous cavernous and supraclinoid internal carotid   arteries. The anterior cerebral arteries anterior communicating artery   and middle cerebral arteries are normal.      The normal intracranial venous circulation is identified. The transverse   sinuses are codominant. The superior sagittal sinus, internal cerebral   veins, vein of Shay, straight sinus, transverse sinuses, sigmoid sinuses   and internal jugular veins are normal. Cortical veins are normal.    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 Occlusion of the distal left posterior cerebral artery on   CTA.    < end of copied text >  < from: CT Angio Head w/ IV Cont (01.11.22 @ 09:15) >  ACC: [id number] EXAM:  CT BRAIN STROKE PROTOCOL                        ACC: [id number] EXAM:  CT ANGIO BRAIN (W)AW IC                        ACC: [id number] EXAM:  CT ANGIO NECK (W)[facility]                          PROCEDURE DATE:  01/11/2022          INTERPRETATION:  Clinical Indication: Code stroke for altered mental   status, dizziness, unsteady gait, prostate cancer    5mm axial sections of the brain were obtained from base to vertex,   without the intravenous administration of contrast material. Coronal and   sagittal computer generated reconstructed are available.    No prior brain imaging is available for comparison.          The ventricles and sulci are prominent consistent age appropriate   involutional changes. Small vessel white matter ischemic changes are   noted. There is no hemorrhage. There is an old right occipital infarct.   Lucency in the left medial temporal occipital region is identified which   has the appearance of a subacute left posterior cerebral artery infarct   with sulcal effacement.        After the intravenous power injection of 70 cc of Omnipaque 300 using a   bolus munira timing run serial thin sections were obtained through the   neck from the thoracic inlet through the intracranial circulation   centered at the [id number]-cz-Xgvump on a multislice CT scanner reformatted   with coronal and sagittal 2 D-MIP projections, including 3 D   reconstructions using a separate 3D [employer] software workstation. A total   of 70  cc of Omnipaque were intravenously injected. 30  cc were discarded.    The origins of the carotid vertebral arteries are normal. The vertebral   arteries are codominant. The carotid bifurcations are unremarkable.    The distal vertebral arteries are well identified as are the   posterior-inferior cerebellar arteries bilaterally. The region of the   vertebral basilar junction is normal. The basilar artery is normal. The   right posterior cerebral and superior cerebellar arteries are normal.   There is occlusion of the distal left posterior cerebral artery.    Evaluation of the carotid arteries demonstrate normal appearance to the   distal cervical, petrous cavernous and supraclinoid internal carotid   arteries. The anterior cerebral arteries anterior communicating artery   and middle cerebral arteries are normal.      The normal intracranial venous circulation is identified. The transverse   sinuses are codominant. The superior sagittal sinus, internal cerebral   veins, vein of Shay, straight sinus, transverse sinuses, sigmoid sinuses   and internal jugular veins are normal. Cortical veins are normal.    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 Occlusion of the distal left posterior cerebral artery on   CTA.    < end of copied text >  < from: CT Brain Stroke Protocol (01.11.22 @ 09:15) >  ACC: [id number] EXAM:  CT BRAIN STROKE PROTOCOL                        ACC: [id number] EXAM:  CT ANGIO BRAIN (W)AW IC                        ACC: [id number] EXAM:  CT ANGIO NECK (W) IC                          PROCEDURE DATE:  01/11/2022          INTERPRETATION:  Clinical Indication: Code stroke for altered mental   status, dizziness, unsteady gait, prostate cancer    5mm axial sections of the brain were obtained from base to vertex,   without the intravenous administration of contrast material. Coronal and   sagittal computer generated reconstructed are available.    No prior brain imaging is available for comparison.          The ventricles and sulci are prominent consistent age appropriate   involutional changes. Small vessel white matter ischemic changes are   noted. There is no hemorrhage. There is an old right occipital infarct.   Lucency in the left medial temporal occipital region is identified which   has the appearance of a subacute left posterior cerebral artery infarct   with sulcal effacement.        After the intravenous power injection of 70 cc of Omnipaque 300 using a   bolus munira timing run serial thin sections were obtained through the   neck from the thoracic inlet through the intracranial circulation   centered at the eoamft-gb-Meymbd on a multislice CT scanner reformatted   with coronal and sagittal 2 D-MIP projections, including 3 D   reconstructions using a separate 3D [employer] software workstation. A total   of 70  cc of Omnipaque were intravenously injected. 30  cc were discarded.    The origins of the carotid vertebral arteries are normal. The vertebral   arteries are codominant. The carotid bifurcations are unremarkable.    The distal vertebral arteries are well identified as are the   posterior-inferior cerebellar arteries bilaterally. The region of the   vertebral basilar junction is normal. The basilar artery is normal. The   right posterior cerebral and superior cerebellar arteries are normal.   There is occlusion of the distal left posterior cerebral artery.    Evaluation of the carotid arteries demonstrate normal appearance to the   distal cervical, petrous cavernous and supraclinoid internal carotid   arteries. The anterior cerebral arteries anterior communicating artery   and middle cerebral arteries are normal.      The normal intracranial venous circulation is identified. The transverse   sinuses are codominant. The superior sagittal sinus, internal cerebral   veins, vein of Shay, straight sinus, transverse sinuses, sigmoid sinuses   and internal jugular veins are normal. Cortical veins are normal.    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 Occlusion of the distal left posterior cerebral artery on   CTA.    < end of copied text >    OTHER: 	  	  LABS:	 	    CARDIAC MARKERS:   Troponin T, High Sensitivity Result: 38: Specimen not hemolyzed                                   9.8    8.66  )-----------( 48       ( 11 Jan 2022 09:06 )             30.5     01-11    138  |  106  |  19  ----------------------------<  196<H>  4.2   |  22  |  0.80    Ca    8.3<L>      11 Jan 2022 09:06    TPro  5.1<L>  /  Alb  2.7<L>  /  TBili  1.2  /  DBili  x   /  AST  23  /  ALT  14  /  AlkPhos  156<H>  01-11    proBNP:   Lipid Profile:   HgA1c:   TSH:            CHIEF COMPLAINT: altered mental status    HISTORY OF PRESENT ILLNESS:  A 76 year old male poor historian with PMH of HTN, prostate cancer diagnosed 6/2019 s/p prostectemy, hepatic carcinoma dx Nov 2019 on chemotherapy, recent DVT diagnosis requiring thrombectomy on xarelto, and PSH of bipass sx has presented to the ED from home as a code stroke for AMS. Wife told EMS that pt has been confused  since last night and did not know day, month, year after he was questioned and he was walking to the opposite side of what he was told. LKW was sometime last night, exact time unknown by EMS. Pt states that around 4 AM he opened his eye in bed and states that he may have had an episode of blurry vision, unsure as it was dark in the house. Pt denies any recent headache, nausea, vomiting, numbness, tingling, weakness, trauma, changes in hearing, pains, fever, chills. Pt is not a TPA candidate as he's outside window. Not a thrombectomy candidate as no LVO seen on imaging. Pt states that he does not take any medications at home.   denies chest pain, shortness of breath, difficulty breathing.     PAST MEDICAL & SURGICAL HISTORY:          MEDICATIONS:                  FAMILY HISTORY:      SOCIAL HISTORY:    [ ] Non-smoker  [ ] Smoker  [ ] Alcohol    Allergies    Allergy Status Unknown    Intolerances    	    REVIEW OF SYSTEMS:  CONSTITUTIONAL:  +weakness, no fever, weight loss, or fatigue  EYES: difficulty leaving left eye open, no visual disturbances, no discharge  ENMT:  No difficulty hearing, tinnitus, vertigo; No sinus or throat pain  NECK: No pain or stiffness  RESPIRATORY: No cough, wheezing, chills or hemoptysis; No Shortness of Breath  CARDIOVASCULAR: No chest pain, palpitations, passing out, dizziness, or leg swelling  GASTROINTESTINAL: No abdominal or epigastric pain. No nausea, vomiting, or hematemesis; No diarrhea or constipation. No melena or hematochezia.  GENITOURINARY: No dysuria, frequency, hematuria, or incontinence  NEUROLOGICAL: No headaches, memory loss, loss of strength, numbness, or tremors  SKIN: No itching, burning, rashes, or lesions   LYMPH Nodes: No enlarged glands  ENDOCRINE: No heat or cold intolerance; No hair loss  MUSCULOSKELETAL: No joint pain or swelling; No muscle, back, or extremity pain  PSYCHIATRIC: No depression, anxiety, mood swings, or difficulty sleeping  HEME/LYMPH: No easy bruising, or bleeding gums  ALLERY AND IMMUNOLOGIC: No hives or eczema	    [ ] All others negative	  [ ] Unable to obtain    PHYSICAL EXAM:  T(C): 36.9 (01-11-22 @ 15:13), Max: 36.9 (01-11-22 @ 09:07)  HR: 75 (01-11-22 @ 15:13) (75 - 89)  BP: 119/69 (01-11-22 @ 15:13) (119/69 - 148/78)  RR: 18 (01-11-22 @ 15:13) (16 - 19)  SpO2: 97% (01-11-22 @ 15:13) (97% - 100%)  Wt(kg): --  I&O's Summary      Appearance: Normal, NAD	  HEENT:   Normal oral mucosa, PERRL, EOMI	  Lymphatic: No lymphadenopathy  Cardiovascular: Normal S1 S2, No JVD, No murmurs, No edema  Respiratory: Lungs clear to auscultation	  Psychiatry: A & O x 3 with some forgetfulness, Mood & affect appropriate  Gastrointestinal:  Soft, Non-tender, + BS	  Skin: No rashes, No ecchymoses, No cyanosis	  Neurologic: Non-focal  Extremities: + edema BLE, Normal range of motion, No clubbing, cyanosis  Vascular: Peripheral pulses palpable 2+ bilaterally    TELEMETRY: 	SR       ECG:  	    RADIOLOGY:  < from: Xray Chest 1 View- PORTABLE-Urgent (01.11.22 @ 09:39) >  ACC: [id number] EXAM:  XR CHEST PORTABLE URGENT 1V                          PROCEDURE DATE:  01/11/2022          INTERPRETATION:  TIME OF EXAM: January 11, 2022 at 9:33 AM.    CLINICAL INFORMATION: Stroke code.    COMPARISON:  None available    TECHNIQUE:   AP Portable chest x-ray. Lateral bases excluded from image.    INTERPRETATION:    Heart size and the mediastinum cannot be accurately evaluated on this   projection. Calcified thoracic aorta.  CT injectable right IJ approach port with tip in the SVC.  The included lungs are clear.  No pleural effusion or pneumothorax is seen.  There is osteoarthritic degenerative change of the spine.        IMPRESSION:  The included lungs are clear.    < end of copied text >  < from: CT Angio Neck w/ IV Cont (01.11.22 @ 09:15) >  ACC: [id number] EXAM:  CT BRAIN STROKE PROTOCOL                        ACC: [id number] EXAM:  CT ANGIO BRAIN (W)[facility]                        ACC: [id number] EXAM:  CT ANGIO NECK (W)AW IC                          PROCEDURE DATE:  01/11/2022          INTERPRETATION:  Clinical Indication: Code stroke for altered mental   status, dizziness, unsteady gait, prostate cancer    5mm axial sections of the brain were obtained from base to vertex,   without the intravenous administration of contrast material. Coronal and   sagittal computer generated reconstructed are available.    No prior brain imaging is available for comparison.          The ventricles and sulci are prominent consistent age appropriate   involutional changes. Small vessel white matter ischemic changes are   noted. There is no hemorrhage. There is an old right occipital infarct.   Lucency in the left medial temporal occipital region is identified which   has the appearance of a subacute left posterior cerebral artery infarct   with sulcal effacement.        After the intravenous power injection of 70 cc of Omnipaque 300 using a   bolus munira timing run serial thin sections were obtained through the   neck from the thoracic inlet through the intracranial circulation   centered at the awqnzm-lr-Sntmqm on a multislice CT scanner reformatted   with coronal and sagittal 2 D-MIP projections, including 3 D   reconstructions using a separate 3D St[employer] software workstation. A total   of 70  cc of Omnipaque were intravenously injected. 30  cc were discarded.    The origins of the carotid vertebral arteries are normal. The vertebral   arteries are codominant. The carotid bifurcations are unremarkable.    The distal vertebral arteries are well identified as are the   posterior-inferior cerebellar arteries bilaterally. The region of the   vertebral basilar junction is normal. The basilar artery is normal. The   right posterior cerebral and superior cerebellar arteries are normal.   There is occlusion of the distal left posterior cerebral artery.    Evaluation of the carotid arteries demonstrate normal appearance to the   distal cervical, petrous cavernous and supraclinoid internal carotid   arteries. The anterior cerebral arteries anterior communicating artery   and middle cerebral arteries are normal.      The normal intracranial venous circulation is identified. The transverse   sinuses are codominant. The superior sagittal sinus, internal cerebral   veins, vein of Shay, straight sinus, transverse sinuses, sigmoid sinuses   and internal jugular veins are normal. Cortical veins are normal.    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 Occlusion of the distal left posterior cerebral artery on   CTA.    < end of copied text >  < from: CT Angio Head w/ IV Cont (01.11.22 @ 09:15) >  ACC: [id number] EXAM:  CT BRAIN STROKE PROTOCOL                        ACC: [id number] EXAM:  CT ANGIO BRAIN (W)[facility]                        ACC: [id number] EXAM:  CT ANGIO NECK (W)[facility]                          PROCEDURE DATE:  01/11/2022          INTERPRETATION:  Clinical Indication: Code stroke for altered mental   status, dizziness, unsteady gait, prostate cancer    5mm axial sections of the brain were obtained from base to vertex,   without the intravenous administration of contrast material. Coronal and   sagittal computer generated reconstructed are available.    No prior brain imaging is available for comparison.          The ventricles and sulci are prominent consistent age appropriate   involutional changes. Small vessel white matter ischemic changes are   noted. There is no hemorrhage. There is an old right occipital infarct.   Lucency in the left medial temporal occipital region is identified which   has the appearance of a subacute left posterior cerebral artery infarct   with sulcal effacement.        After the intravenous power injection of 70 cc of Omnipaque 300 using a   bolus munira timing run serial thin sections were obtained through the   neck from the thoracic inlet through the intracranial circulation   centered at the pmslci-jz-Ekgqdd on a multislice CT scanner reformatted   with coronal and sagittal 2 D-MIP projections, including 3 D   reconstructions using a separate 3D St[employer] software workstation. A total   of 70  cc of Omnipaque were intravenously injected. 30  cc were discarded.    The origins of the carotid vertebral arteries are normal. The vertebral   arteries are codominant. The carotid bifurcations are unremarkable.    The distal vertebral arteries are well identified as are the   posterior-inferior cerebellar arteries bilaterally. The region of the   vertebral basilar junction is normal. The basilar artery is normal. The   right posterior cerebral and superior cerebellar arteries are normal.   There is occlusion of the distal left posterior cerebral artery.    Evaluation of the carotid arteries demonstrate normal appearance to the   distal cervical, petrous cavernous and supraclinoid internal carotid   arteries. The anterior cerebral arteries anterior communicating artery   and middle cerebral arteries are normal.      The normal intracranial venous circulation is identified. The transverse   sinuses are codominant. The superior sagittal sinus, internal cerebral   veins, vein of Shay, straight sinus, transverse sinuses, sigmoid sinuses   and internal jugular veins are normal. Cortical veins are normal.    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 Occlusion of the distal left posterior cerebral artery on   CTA.    < end of copied text >  < from: CT Brain Stroke Protocol (01.11.22 @ 09:15) >  ACC: [id number] EXAM:  CT BRAIN STROKE PROTOCOL                        ACC: [id number] EXAM:  CT ANGIO BRAIN (W)AW IC                        ACC: [id number] EXAM:  CT ANGIO NECK (W)AW IC                          PROCEDURE DATE:  01/11/2022          INTERPRETATION:  Clinical Indication: Code stroke for altered mental   status, dizziness, unsteady gait, prostate cancer    5mm axial sections of the brain were obtained from base to vertex,   without the intravenous administration of contrast material. Coronal and   sagittal computer generated reconstructed are available.    No prior brain imaging is available for comparison.          The ventricles and sulci are prominent consistent age appropriate   involutional changes. Small vessel white matter ischemic changes are   noted. There is no hemorrhage. There is an old right occipital infarct.   Lucency in the left medial temporal occipital region is identified which   has the appearance of a subacute left posterior cerebral artery infarct   with sulcal effacement.        After the intravenous power injection of 70 cc of Omnipaque 300 using a   bolus munira timing run serial thin sections were obtained through the   neck from the thoracic inlet through the intracranial circulation   centered at the xsoikd-tu-Pneftz on a multislice CT scanner reformatted   with coronal and sagittal 2 D-MIP projections, including 3 D   reconstructions using a separate 3D St[employer] software workstation. A total   of 70  cc of Omnipaque were intravenously injected. 30  cc were discarded.    The origins of the carotid vertebral arteries are normal. The vertebral   arteries are codominant. The carotid bifurcations are unremarkable.    The distal vertebral arteries are well identified as are the   posterior-inferior cerebellar arteries bilaterally. The region of the   vertebral basilar junction is normal. The basilar artery is normal. The   right posterior cerebral and superior cerebellar arteries are normal.   There is occlusion of the distal left posterior cerebral artery.    Evaluation of the carotid arteries demonstrate normal appearance to the   distal cervical, petrous cavernous and supraclinoid internal carotid   arteries. The anterior cerebral arteries anterior communicating artery   and middle cerebral arteries are normal.      The normal intracranial venous circulation is identified. The transverse   sinuses are codominant. The superior sagittal sinus, internal cerebral   veins, vein of Shay, straight sinus, transverse sinuses, sigmoid sinuses   and internal jugular veins are normal. Cortical veins are normal.    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 Occlusion of the distal left posterior cerebral artery on   CTA.    < end of copied text >    OTHER: 	  	  LABS:	 	    CARDIAC MARKERS:   Troponin T, High Sensitivity Result: 38: Specimen not hemolyzed                                   9.8    8.66  )-----------( 48       ( 11 Jan 2022 09:06 )             30.5     01-11    138  |  106  |  19  ----------------------------<  196<H>  4.2   |  22  |  0.80    Ca    8.3<L>      11 Jan 2022 09:06    TPro  5.1<L>  /  Alb  2.7<L>  /  TBili  1.2  /  DBili  x   /  AST  23  /  ALT  14  /  AlkPhos  156<H>  01-11    proBNP:   Lipid Profile:   HgA1c:   TSH:

## 2022-01-11 NOTE — CONSULT NOTE ADULT - SUBJECTIVE AND OBJECTIVE BOX
· Chief Complaint: The patient is a 76y Male complaining of altered mental status.  · HPI Objective Statement: 76yr M hx of prior strokes, prostate CA on chemo, recent DVT diagnosis requiring thrombectomy , on xarelto p/w AMS. per wife, is being confused increasingly since last night. continued to mistake places at home and complaining of not being able to see the TV however was looking at other objects.   	wife denies fever chills, n/v, abd pain, urinary sx, cough, sorethroat, sick contacts.  	is covid vax and boosted  	at baseline, is alert and oriented and is functional and ambulatory  presented as stroke alert  HPI:    PAST MEDICAL & SURGICAL HISTORY:      Allergies:    Family history:  Social history:  Meds:    Labs:  CBC Full  -  ( 11 Jan 2022 09:06 )  WBC Count : 8.66 K/uL  Hemoglobin : 9.8 g/dL  Hematocrit : 30.5 %  Platelet Count - Automated : 48 K/uL  Mean Cell Volume : 102.0 fl  Mean Cell Hemoglobin : 32.8 pg  Mean Cell Hemoglobin Concentration : 32.1 gm/dL  Auto Neutrophil # : 7.12 K/uL  Auto Lymphocyte # : 0.74 K/uL  Auto Monocyte # : 0.44 K/uL  Auto Eosinophil # : 0.36 K/uL  Auto Basophil # : 0.00 K/uL  Auto Neutrophil % : 82.2 %  Auto Lymphocyte % : 8.5 %  Auto Monocyte % : 5.1 %  Auto Eosinophil % : 4.2 %  Auto Basophil % : 0.0 %    01-11    138  |  106  |  19  ----------------------------<  196<H>  4.2   |  22  |  0.80    Ca    8.3<L>      11 Jan 2022 09:06    TPro  5.1<L>  /  Alb  2.7<L>  /  TBili  1.2  /  DBili  x   /  AST  23  /  ALT  14  /  AlkPhos  156<H>  01-11      Radiology:     < from: CT Brain Stroke Protocol (01.11.22 @ 09:15) >  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 Occlusion of the distal left posterior cerebral artery on     < end of copied text >          ROS:  No pain, no fever  No lumps in neck or pain  No Sob or chest pain  No palpitations   No abdominal pain. No change in bowel habit. No blood in stools  No weakness in extremities  No leg swelling      Vital Signs Last 24 Hrs  T(C): 36.9 (11 Jan 2022 09:07), Max: 36.9 (11 Jan 2022 09:07)  T(F): 98.5 (11 Jan 2022 09:07), Max: 98.5 (11 Jan 2022 09:07)  HR: 89 (11 Jan 2022 09:18) (82 - 89)  BP: 137/69 (11 Jan 2022 09:18) (124/68 - 148/78)  BP(mean): --  RR: 18 (11 Jan 2022 09:18) (16 - 19)  SpO2: 100% (11 Jan 2022 09:18) (98% - 100%)    Physical Exam:  Patient comfortable  AXOX3  Neck supple, no LN's  Chest: bilateral breath sounds, no wheeze or rales  CVS: regular heart rate without murmur  Abdomen: soft, BS+, no massess or organomegaly  CNS: no gross deficit  Ext: no edema       A 76 year old male poor historian with PMH of HTN, prostate cancer diagnosed 6/2019 s/p prostectemy, 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 Wife told me that pt has been confused  since 1/9/22 night and did not know day, month, year after he was questioned and he was walking to the opposite side of what he was told.  Pt denies any recent headache, nausea, vomiting, numbness, tingling, weakness, trauma, changes in hearing, pains, fever, chills.       PAST MEDICAL & SURGICAL HISTORY:      Allergies: NKA    Family history: Lives with wife  Social history: non smoker  Meds:  As in chart    Labs:  CBC Full  -  ( 11 Jan 2022 09:06 )  WBC Count : 8.66 K/uL  Hemoglobin : 9.8 g/dL  Hematocrit : 30.5 %  Platelet Count - Automated : 48 K/uL  Mean Cell Volume : 102.0 fl  Mean Cell Hemoglobin : 32.8 pg  Mean Cell Hemoglobin Concentration : 32.1 gm/dL  Auto Neutrophil # : 7.12 K/uL  Auto Lymphocyte # : 0.74 K/uL  Auto Monocyte # : 0.44 K/uL  Auto Eosinophil # : 0.36 K/uL  Auto Basophil # : 0.00 K/uL  Auto Neutrophil % : 82.2 %  Auto Lymphocyte % : 8.5 %  Auto Monocyte % : 5.1 %  Auto Eosinophil % : 4.2 %  Auto Basophil % : 0.0 %    01-11    138  |  106  |  19  ----------------------------<  196<H>  4.2   |  22  |  0.80    Ca    8.3<L>      11 Jan 2022 09:06    TPro  5.1<L>  /  Alb  2.7<L>  /  TBili  1.2  /  DBili  x   /  AST  23  /  ALT  14  /  AlkPhos  156<H>  01-11      Radiology:     < from: CT Brain Stroke Protocol (01.11.22 @ 09:15) >  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 Occlusion of the distal left posterior cerebral artery on     < end of copied text >          ROS:  No pain, no fever  No lumps in neck or pain  No Sob or chest pain  No palpitations   No abdominal pain. No change in bowel habit. No blood in stools  No weakness in extremities  No leg swelling      Vital Signs Last 24 Hrs  T(C): 36.9 (11 Jan 2022 09:07), Max: 36.9 (11 Jan 2022 09:07)  T(F): 98.5 (11 Jan 2022 09:07), Max: 98.5 (11 Jan 2022 09:07)  HR: 89 (11 Jan 2022 09:18) (82 - 89)  BP: 137/69 (11 Jan 2022 09:18) (124/68 - 148/78)  BP(mean): --  RR: 18 (11 Jan 2022 09:18) (16 - 19)  SpO2: 100% (11 Jan 2022 09:18) (98% - 100%)    Physical Exam:  Patient comfortable  Confused, wife at bedside  Neck supple, no LN's  Chest: bilateral breath sounds, no wheeze or rales  CVS: regular heart rate without murmur  Abdomen: soft, BS+, no massess or organomegaly  CNS: no gross deficit  Ext: no edema

## 2022-01-11 NOTE — H&P ADULT - HISTORY OF PRESENT ILLNESS
76yr M hx of prior strokes, prostate CA on chemo, recent DVT diagnosis requiring thrombectomy , on xarelto p/w AMS. per wife, is being confused increasingly since last night. continued to mistake places at home and complaining of not being able to see the TV however was looking at other objects.   	wife denies fever chills, n/v, abd pain, urinary sx, cough, sorethroat, sick contacts.  	is covid vax and boosted  	at baseline, is alert and oriented and is functional and ambulatory  presented as stroke alert  noted to have uti

## 2022-01-11 NOTE — CONSULT NOTE ADULT - ASSESSMENT
76yr M hx of prior strokes, prostate CA on chemo, recent DVT diagnosis requiring thrombectomy , on xarelto p/w AMS.  r/o stroke.  (+) UA    ER vss, afebrile.  No leukocytosis.      UTI:    - p/w AMS.  UA (+)  - f/u ucx  - Cont rocephin  - If pt febrile, send bcx x 2.    Catrachita Wadsworth  [phone]

## 2022-01-11 NOTE — H&P ADULT - ASSESSMENT
75 yo male h/o cva, prostate ca, hepatobiliary ca, dvt on xarelto, here with AMS  r/o cva  found to have uti    AMS  r/o cva  CT head noted  neuro consult noted  f/u mri    uti  ceftriax  f/u cult and sens    prostate ca, hepatobiliary ca  onc eval noted  outpt chemo    dvt  on xarelto    cont other home meds      Advanced care planning was discussed with patient and family.  Advanced care planning forms were reviewed and discussed as appropriate.  Differential diagnosis and plan of care discussed with patient after the evaluation.   Pain assessed and judicious use of narcotics when appropriate was discussed.  Importance of Fall prevention discussed.  Counseling on Smoking and Alcohol cessation was offered when appropriate.  Counseling on Diet, exercise, and medication compliance was done.       Approx 60 minutes spent.

## 2022-01-11 NOTE — CONSULT NOTE ADULT - ASSESSMENT
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 Wife told me that pt has been confused  since 1/9/22 night and did not know day, month, year after he was questioned and he was walking to the opposite side of what he was told.  Pt denies any recent headache, nausea, vomiting, numbness, tingling, weakness, trauma, changes in hearing, pains, fever, chills.   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  He was treated with Cisplatinum and Gemzar. He  was changed to FOLFOX in 10/21 for disease progression. I general he tolerated chemo well. Last chemo was 1/6-8/22.  Plan in hospital will be management of infection, neuro follow up.  Follow up CBC. At present WBC are OK, platelets low but safe from chemo  Continue AC  On dc will follow up in office to continue management of his hepatobiliary ca with Dr. Lua

## 2022-01-11 NOTE — H&P ADULT - NSICDXPASTMEDICALHX_GEN_ALL_CORE_FT
PAST MEDICAL HISTORY:  CVA (cerebral vascular accident)     Deep vein thrombosis (DVT)     Hepatobiliary cancer     Prostate CA

## 2022-01-11 NOTE — ED ADULT TRIAGE NOTE - BEFAST ARM SIDE DRIFT
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  Patient was treated for the following conditions:  #Left Lower Extremity Venous Stasis Ulcer: Patient has history of kaposi sarcoma getting chemotherapy treatments at [facility] with Dr. Chance (as per son, gets Doxil every four weeks, last treatment was 2/21). Arterial duplex was performed and demonstrated moderate to severe stenosis in left superficial femoral artery, no DVT. Vascular surgery team is planning for Angiogram as outpatient. ID was consulted and recommended no antibiotics at this time. Podiatry consulted and recommended dressing changes with santyl. Will need vascular and podiatry outpatient follow up.   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 [place] with Dr. Chance (as per son, gets Doxil every four weeks, last treatment was 2/21)   -  Arterial duplex positive for moderate to severe stenosis in left superficial femoral artery, no DVT  - s/p Angiogram 3/12 - sfa angioplasty with three vessel run off   -ID following: no antibiotics needed at this time  - Dressing changes as per podiatry- cw with Santyl  - Pain control with fentanyl patch (home med) and tylenol PRN  - Can follow up with vascular outpatient     #HTN  stable during admission, can continue to take home meds on discharge    #Dementia/ sundowning  -stable,  cw home meds on discharge

## 2022-01-11 NOTE — ED ADULT NURSE NOTE - OBJECTIVE STATEMENT
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 as per pt wife the pt became very angry over the last 2 days which is not like him, the pt became confused and stating that the year is 1960, as per wife the pt developed an unsteady gait and had to hold onto wall when ambulating, the pt developed right peripheral vision deficits. this morning the pt states his "vision was a little off", as per wife the pt was unable to locate the light switch in the bedroom. upon assessment pt unable to verbalize seeing a pen. pt has +3 pitting edema bilaterally on lower extremities. as per pt wife in 2020 pt developed liver cancer and is currently receiving chemotherapy, pt wife states nodules were found in the pt lungs recently but it was of no concern to oncologist. pt has a port. Pt oncologist is Dr. Rivas Lua, neurologist is Dr. BRONWYN Tellez. bilateral equal strength of extremities, PERRL 3mm bilaterally, IV access established, cardiac monitor placed, safety and comfort provided, neuro flow sheet in paper chart. 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 as per pt wife the pt became very angry over the last 2 days which is not like him, the pt became confused and stating that the year is 1959, as per wife the pt developed an unsteady gait and had to hold onto wall when ambulating, the pt developed right peripheral vision deficits. this morning the pt states his "vision was a little off", as per wife the pt was unable to locate the light switch in the bedroom. upon assessment pt unable to verbalize seeing a pen. pt has +3 pitting edema bilaterally on lower extremities. as per pt wife in 2020 pt developed liver cancer and is currently receiving chemotherapy, pt wife states nodules were found in the pt lungs recently but it was of no concern to oncologist, as per wife pt was found to have low platelet count recently. pt has a port. Pt oncologist is Dr. Rivas Lua, neurologist is Dr. BRONWYN Tellez. bilateral equal strength of extremities, PERRL 3mm bilaterally, IV access established, cardiac monitor placed, safety and comfort provided, neuro flow sheet in paper chart.

## 2022-01-12 LAB
ANION GAP SERPL CALC-SCNC: 8 MMOL/L — SIGNIFICANT CHANGE UP (ref 5–17)
BUN SERPL-MCNC: 17 MG/DL — SIGNIFICANT CHANGE UP (ref 7–23)
CALCIUM SERPL-MCNC: 8.5 MG/DL — SIGNIFICANT CHANGE UP (ref 8.4–10.5)
CHLORIDE SERPL-SCNC: 106 MMOL/L — SIGNIFICANT CHANGE UP (ref 96–108)
CO2 SERPL-SCNC: 23 MMOL/L — SIGNIFICANT CHANGE UP (ref 22–31)
CREAT SERPL-MCNC: 0.82 MG/DL — SIGNIFICANT CHANGE UP (ref 0.5–1.3)
GLUCOSE SERPL-MCNC: 162 MG/DL — HIGH (ref 70–99)
HCT VFR BLD CALC: 31 % — LOW (ref 39–50)
HCV AB S/CO SERPL IA: 0.15 S/CO — SIGNIFICANT CHANGE UP (ref 0–0.99)
HCV AB SERPL-IMP: SIGNIFICANT CHANGE UP
HGB BLD-MCNC: 9.8 G/DL — LOW (ref 13–17)
MCHC RBC-ENTMCNC: 31.6 GM/DL — LOW (ref 32–36)
MCHC RBC-ENTMCNC: 32.6 PG — SIGNIFICANT CHANGE UP (ref 27–34)
MCV RBC AUTO: 103 FL — HIGH (ref 80–100)
NRBC # BLD: 0 /100 WBCS — SIGNIFICANT CHANGE UP (ref 0–0)
PLATELET # BLD AUTO: 42 K/UL — LOW (ref 150–400)
POTASSIUM SERPL-MCNC: 4.2 MMOL/L — SIGNIFICANT CHANGE UP (ref 3.5–5.3)
POTASSIUM SERPL-SCNC: 4.2 MMOL/L — SIGNIFICANT CHANGE UP (ref 3.5–5.3)
RBC # BLD: 3.01 M/UL — LOW (ref 4.2–5.8)
RBC # FLD: 15.7 % — HIGH (ref 10.3–14.5)
SODIUM SERPL-SCNC: 137 MMOL/L — SIGNIFICANT CHANGE UP (ref 135–145)
TSH SERPL-MCNC: 2.72 UIU/ML — SIGNIFICANT CHANGE UP (ref 0.27–4.2)
WBC # BLD: 8.79 K/UL — SIGNIFICANT CHANGE UP (ref 3.8–10.5)
WBC # FLD AUTO: 8.79 K/UL — SIGNIFICANT CHANGE UP (ref 3.8–10.5)

## 2022-01-12 PROCEDURE — 93306 TTE W/DOPPLER COMPLETE: CPT | Mod: 26

## 2022-01-12 PROCEDURE — 70551 MRI BRAIN STEM W/O DYE: CPT | Mod: 26

## 2022-01-12 RX ORDER — SODIUM CHLORIDE 9 MG/ML
500 INJECTION INTRAMUSCULAR; INTRAVENOUS; SUBCUTANEOUS ONCE
Refills: 0 | Status: COMPLETED | OUTPATIENT
Start: 2022-01-12 | End: 2022-01-12

## 2022-01-12 RX ADMIN — CEFTRIAXONE 100 MILLIGRAM(S): 500 INJECTION, POWDER, FOR SOLUTION INTRAMUSCULAR; INTRAVENOUS at 16:15

## 2022-01-12 RX ADMIN — SODIUM CHLORIDE 1000 MILLILITER(S): 9 INJECTION INTRAMUSCULAR; INTRAVENOUS; SUBCUTANEOUS at 11:27

## 2022-01-12 RX ADMIN — RIVAROXABAN 20 MILLIGRAM(S): KIT at 17:22

## 2022-01-12 NOTE — PATIENT PROFILE ADULT - FALL HARM RISK - RISK INTERVENTIONS

## 2022-01-12 NOTE — PROGRESS NOTE ADULT - ASSESSMENT
75 yo male h/o cva, prostate ca, hepatobiliary ca, dvt on xarelto, here with AMS  r/o cva  found to have uti    AMS  pt with subacute infarct on CT  neuro f/u noted  pending mri  echo    uti  ceftriax  f/u cult and sens    prostate ca, hepatobiliary ca  onc eval noted  outpt chemo    dvt  on xarelto - monitor plts. f/u with onc    cont other home meds      Advanced care planning was discussed with patient and family.  Advanced care planning forms were reviewed and discussed as appropriate.  Differential diagnosis and plan of care discussed with patient after the evaluation.   Pain assessed and judicious use of narcotics when appropriate was discussed.  Importance of Fall prevention discussed.  Counseling on Smoking and Alcohol cessation was offered when appropriate.  Counseling on Diet, exercise, and medication compliance was done.       Approx 60 minutes spent. 75 yo male h/o cva, prostate ca, hepatobiliary ca, dvt on xarelto, here with AMS  r/o cva  found to have uti    AMS  pt with subacute infarct on CT  neuro f/u noted  pending mri  echo    uti  ceftriax  f/u cult and sens    prostate ca, hepatobiliary ca  onc eval noted  outpt chemo    dvt  on xarelto - monitor plts. f/u with onc    cont other home meds    multiple attempts made to call wife  voice mail left    Advanced care planning was discussed with patient and family.  Advanced care planning forms were reviewed and discussed as appropriate.  Differential diagnosis and plan of care discussed with patient after the evaluation.   Pain assessed and judicious use of narcotics when appropriate was discussed.  Importance of Fall prevention discussed.  Counseling on Smoking and Alcohol cessation was offered when appropriate.  Counseling on Diet, exercise, and medication compliance was done.       Approx 60 minutes spent.

## 2022-01-12 NOTE — PROVIDER CONTACT NOTE (OTHER) - NAME OF MD/NP/PA/DO NOTIFIED:
Pt presents with cough since yesterday. No fevers. Pt acting appropriately in traige  
Trinity OSMAN
DAWSON Escalera and MD Niranjan Castro

## 2022-01-12 NOTE — PROGRESS NOTE ADULT - ASSESSMENT
76yr M hx of prior strokes, prostate CA on chemo, recent DVT diagnosis requiring thrombectomy , on xarelto p/w AMS.  r/o stroke.  (+) UA    ER vss, afebrile.  No leukocytosis.      UTI:    - p/w AMS.  UA (+)  - f/u ucx  - Cont rocephin  - If pt febrile, send bcx x 2.    Thrombotic stroke:    - A/w AMS.  CT brain with subacute post cerebral thrombosis    - Neuro f/u appreciated.  Awaiting MRI and cardiac w/u      Catrachita Wadsworth  [phone]

## 2022-01-12 NOTE — PROVIDER CONTACT NOTE (OTHER) - BACKGROUND
See H & P
76 year old male with pmhx of heptaobillary ca, prostate ca, DVT, CVA present to hospital for altered mental status

## 2022-01-12 NOTE — PATIENT PROFILE ADULT - SAFE PLACE TO LIVE
Protocol For Protocol For Photochemotherapy For Severe Photoresponsive Dermatoses: Bath Puva: The patient received Photochemotherapy for severe photoresponsive dermatoses: Bath PUVA requiring at least 4 to 8 hours of care under direct physician supervision. Name Of Supervising Technician: STU Protocol For Photochemotherapy: Baby Oil And Nbuvb: The patient received Photochemotherapy: Baby Oil and NBUVB (baby oil applied to all lesions prior to phototherapy). Post-Care Instructions: I reviewed with the patient in detail post-care instructions. Patient is to wear sun protection. Patients may expect sunburn like redness, discomfort and scabbing. Protocol For Photochemotherapy For Severe Photoresponsive Dermatoses: Petrolatum And Nbuvb: The patient received Photochemotherapy for severe photoresponsive dermatoses: Petrolatum and NBUVB requiring at least 4 to 8 hours of care under direct physician supervision. Treatment Number: 4 Protocol For Bath Puva: The patient received Bath PUVA. Protocol For Uva: The patient received UVA. Protocol For Nb Uva: The patient received NB UVA. Protocol For Nbuvb: The patient received NBUVB. Protocol For Broad Band Uvb: The patient received Broad Band UVB. Protocol For Photochemotherapy For Severe Photoresponsive Dermatoses: Tar And Broad Band Uvb (Goeckerman Treatment): The patient received Photochemotherapy for severe photoresponsive dermatoses: Tar and Broad Band UVB (Goeckerman treatment) requiring at least 4 to 8 hours of care under direct physician supervision. no Protocol: NBUVB Consent: Written consent obtained.  The risks were reviewed with the patient including but not limited to: burn, pigmentary changes, pain, blistering, scabbing, redness, increased risk of skin cancers, and the remote possibility of scarring. Comments On Previous Treatment: Patient states no burning or redness Protocol For Photochemotherapy: Triamcinolone Ointment And Nbuvb: The patient received Photochemotherapy: Triamcinolone and NBUVB (triamcinolone ointment applied to all lesions prior to phototherapy). Protocol For Photochemotherapy: Petrolatum And Broad Band Uvb: The patient received Photochemotherapy: Petrolatum and Broad Band UVB. Protocol For Photochemotherapy For Severe Photoresponsive Dermatoses: Tar And Nbuvb (Goeckerman Treatment): The patient received Photochemotherapy for severe photoresponsive dermatoses: Tar and NBUVB (Goeckerman treatment) requiring at least 4 to 8 hours of care under direct physician supervision. Render Post-Care In The Note: no Total Treatment Time: 1:00 Protocol For Photochemotherapy: Tar And Nbuvb (Goeckerman Treatment): The patient received Photochemotherapy: Tar and NBUVB (Goeckerman treatment). Detail Level: Zone Protocol For Photochemotherapy: Mineral Oil And Nbuvb: The patient received Photochemotherapy: Mineral Oil and NBUVB (mineral oil applied to all lesions prior to phototherapy). Protocol For Photochemotherapy: Petrolatum And Nbuvb: The patient received Photochemotherapy: Petrolatum and NBUVB (petrolatum applied to all lesions prior to phototherapy). Protocol For Uva1: The patient received UVA1. Protocol For Puva: The patient received PUVA. Protocol For Photochemotherapy: Mineral Oil And Broad Band Uvb: The patient received Photochemotherapy: Mineral Oil and Broad Band UVB. Protocol For Photochemotherapy For Severe Photoresponsive Dermatoses: Petrolatum And Broad Band Uvb: The patient received Photochemotherapyfor severe photoresponsive dermatoses: Petrolatum and Broad Band UVB requiring at least 4 to 8 hours of care under direct physician supervision. Protocol For Photochemotherapy: Tar And Broad Band Uvb (Goeckerman Treatment): The patient received Photochemotherapy: Tar and Broad Band UVB (Goeckerman treatment). Protocol For Photochemotherapy For Severe Photoresponsive Dermatoses: Puva: The patient received Photochemotherapy for severe photoresponsive dermatoses: PUVA requiring at least 4 to 8 hours of care under direct physician supervision. Total Body Time: 172

## 2022-01-12 NOTE — PROGRESS NOTE ADULT - ASSESSMENT
met hepatobiliary ca on folfox after prog with cis/gemzar with stable disease  previous PE on xarelto now with acute cerebral thrombosis await MRI  cardiac w/u per neuro but likely met cancer contributing to thrombosis risk.

## 2022-01-13 LAB
ANION GAP SERPL CALC-SCNC: 7 MMOL/L — SIGNIFICANT CHANGE UP (ref 5–17)
BUN SERPL-MCNC: 15 MG/DL — SIGNIFICANT CHANGE UP (ref 7–23)
CALCIUM SERPL-MCNC: 8.1 MG/DL — LOW (ref 8.4–10.5)
CHLORIDE SERPL-SCNC: 105 MMOL/L — SIGNIFICANT CHANGE UP (ref 96–108)
CO2 SERPL-SCNC: 26 MMOL/L — SIGNIFICANT CHANGE UP (ref 22–31)
CREAT SERPL-MCNC: 0.81 MG/DL — SIGNIFICANT CHANGE UP (ref 0.5–1.3)
GLUCOSE SERPL-MCNC: 153 MG/DL — HIGH (ref 70–99)
HCT VFR BLD CALC: 29 % — LOW (ref 39–50)
HGB BLD-MCNC: 9.2 G/DL — LOW (ref 13–17)
MCHC RBC-ENTMCNC: 31.7 GM/DL — LOW (ref 32–36)
MCHC RBC-ENTMCNC: 32.5 PG — SIGNIFICANT CHANGE UP (ref 27–34)
MCV RBC AUTO: 102.5 FL — HIGH (ref 80–100)
NRBC # BLD: 0 /100 WBCS — SIGNIFICANT CHANGE UP (ref 0–0)
PLATELET # BLD AUTO: 38 K/UL — LOW (ref 150–400)
POTASSIUM SERPL-MCNC: 4 MMOL/L — SIGNIFICANT CHANGE UP (ref 3.5–5.3)
POTASSIUM SERPL-SCNC: 4 MMOL/L — SIGNIFICANT CHANGE UP (ref 3.5–5.3)
RBC # BLD: 2.83 M/UL — LOW (ref 4.2–5.8)
RBC # FLD: 15.3 % — HIGH (ref 10.3–14.5)
SODIUM SERPL-SCNC: 138 MMOL/L — SIGNIFICANT CHANGE UP (ref 135–145)
WBC # BLD: 7.52 K/UL — SIGNIFICANT CHANGE UP (ref 3.8–10.5)
WBC # FLD AUTO: 7.52 K/UL — SIGNIFICANT CHANGE UP (ref 3.8–10.5)

## 2022-01-13 PROCEDURE — 93970 EXTREMITY STUDY: CPT | Mod: 26

## 2022-01-13 RX ADMIN — CEFTRIAXONE 100 MILLIGRAM(S): 500 INJECTION, POWDER, FOR SOLUTION INTRAMUSCULAR; INTRAVENOUS at 16:52

## 2022-01-13 RX ADMIN — RIVAROXABAN 20 MILLIGRAM(S): KIT at 16:52

## 2022-01-13 NOTE — PROGRESS NOTE ADULT - ASSESSMENT
76 year old obese  male  with  HTN, prostate cancer diagnosed 6/2019 s/p prostectemy, metastaitc hepatic carcinoma dx Nov 2019 on chemotherapy, recent DVT diagnosis requiring thrombectomy on xarelto, presented to the ED from home as a code stroke for AMS.   was confused from night prior   Pt is not a TPA candidate as he's outside window.   Not a thrombectomy candidate as no LVO seen on imaging.    BP on scene 148/84 glucose 164  NIHSS 4 (no age, could not name objects- mild aphasia, R complete hemianopia) on arrival.   preMRS 0  o/e 1/12 AAOx2-3, RHHA (chronic)     CTH with chronic appearing right occipital infarct; but also newer Subacute left posterior cerebral artery infarct.   CTA with Occlusion of the distal left posterior cerebral artery    UA positive.   + thrombocytopenia to 40s  elevated alk phos   MRI brain with subacute L PCA infarct noted   LE with multiple DVTs   TTE with no evidence of PFO    Impression: AMS may 2/2 possible toxic/metabolic/infectious etiology (UTI). subacute stroke on imaging seems to be embolic, ESUS, may be hypercoag from malignancy vs cardiac source  - back on xarelto for DVT. no objection from stroke standpoint.  consider Full dose lovenox given malignancy history.  monitor with thrombocytopenia.  f/u heme/onc but consider holding unless platelets consistantly > 50k  - lipitor 40 for secondary stroke prevention if no contraindication. does have elevated alk phos, held given liver Ca   - send APLS labs, beta 2 glycoprotein, lupus anticoagulant, cardiolipin Abs.  may be altered now that he is on DOAC  - Hemoglobin A1c and lipid panel    - ceftriaxone for UTI, Ucx with E.coli   - no objection to optho. non urgent.  B/L occipital infarcts can cause visual loss   - check blood cx. r/o bacteremia   - telemetry  - PT/OT/SS/SLP, OOBC  - BP normotensive. stroke is subacute   - onc eval noted.   - check FS, glucose control <180  - GI/DVT ppx  - Thank you for allowing me to participate in the care of this patient. Call with questions.   - attempted to call wife Margo at [phone] but no answer. office number left   -Yosi Blancas MD  Vascular Neurology  Office: [phone] .

## 2022-01-13 NOTE — PROGRESS NOTE ADULT - ASSESSMENT
A 76 year old male poor historian with PMH of HTN, prostate cancer diagnosed 6/2019 s/p prostectemy, hepatic carcinoma dx Nov 2019 on chemotherapy, recent DVT diagnosis requiring thrombectomy on xarelto, and PSH of bipass sx presenting with altered mental status.

## 2022-01-13 NOTE — PROGRESS NOTE ADULT - ASSESSMENT
75 yo male h/o cva, prostate ca, hepatobiliary ca, dvt on xarelto, here with AMS  r/o cva  found to have uti    AMS  pt with subacute infarct on CT  neuro f/u noted  MRI noted  echo noted  neuro f/u  on xarelto    uti  ceftriax  f/u cult and sens    prostate ca, hepatobiliary ca  onc eval noted  outpt chemo    dvt  on xarelto - monitor plts. f/u with onc    cont other home meds       Advanced care planning was discussed with patient and family.  Advanced care planning forms were reviewed and discussed as appropriate.  Differential diagnosis and plan of care discussed with patient after the evaluation.   Pain assessed and judicious use of narcotics when appropriate was discussed.  Importance of Fall prevention discussed.  Counseling on Smoking and Alcohol cessation was offered when appropriate.  Counseling on Diet, exercise, and medication compliance was done.       Approx 60 minutes spent.

## 2022-01-13 NOTE — PROGRESS NOTE ADULT - ASSESSMENT
76yr M hx of prior strokes, prostate CA on chemo, recent DVT diagnosis requiring thrombectomy , on xarelto p/w AMS.  r/o stroke.  (+) UA    ER vss, afebrile.  No leukocytosis.      UTI:    - p/w AMS.  UA (+)  - f/u ucx >100K E.coli.  sensis still pending  - Cont rocephin  - If pt febrile, send bcx x 2.    Thrombotic stroke:    - A/w AMS.  CT brain with subacute post cerebral thrombosis    - Neuro f/u appreciated.  MRI shows ACUTE TO SUBACUTE LEFT PCA INFARCT and cardiac w/u in progress    - LE doppler (+) dvt - on Xarelto    d/w wife, Margo, over the phone          Catrachita Wadsworth  [phone]

## 2022-01-14 DIAGNOSIS — I82.409 ACUTE EMBOLISM AND THROMBOSIS OF UNSPECIFIED DEEP VEINS OF UNSPECIFIED LOWER EXTREMITY: ICD-10-CM

## 2022-01-14 LAB
-  AMIKACIN: SIGNIFICANT CHANGE UP
-  AMOXICILLIN/CLAVULANIC ACID: SIGNIFICANT CHANGE UP
-  AMPICILLIN/SULBACTAM: SIGNIFICANT CHANGE UP
-  AMPICILLIN: SIGNIFICANT CHANGE UP
-  AZTREONAM: SIGNIFICANT CHANGE UP
-  CEFAZOLIN: SIGNIFICANT CHANGE UP
-  CEFEPIME: SIGNIFICANT CHANGE UP
-  CEFOXITIN: SIGNIFICANT CHANGE UP
-  CEFTRIAXONE: SIGNIFICANT CHANGE UP
-  CIPROFLOXACIN: SIGNIFICANT CHANGE UP
-  ERTAPENEM: SIGNIFICANT CHANGE UP
-  GENTAMICIN: SIGNIFICANT CHANGE UP
-  IMIPENEM: SIGNIFICANT CHANGE UP
-  LEVOFLOXACIN: SIGNIFICANT CHANGE UP
-  MEROPENEM: SIGNIFICANT CHANGE UP
-  NITROFURANTOIN: SIGNIFICANT CHANGE UP
-  PIPERACILLIN/TAZOBACTAM: SIGNIFICANT CHANGE UP
-  TIGECYCLINE: SIGNIFICANT CHANGE UP
-  TOBRAMYCIN: SIGNIFICANT CHANGE UP
-  TRIMETHOPRIM/SULFAMETHOXAZOLE: SIGNIFICANT CHANGE UP
A1C WITH ESTIMATED AVERAGE GLUCOSE RESULT: 6.5 % — HIGH (ref 4–5.6)
ANION GAP SERPL CALC-SCNC: 9 MMOL/L — SIGNIFICANT CHANGE UP (ref 5–17)
BUN SERPL-MCNC: 16 MG/DL — SIGNIFICANT CHANGE UP (ref 7–23)
CALCIUM SERPL-MCNC: 8.2 MG/DL — LOW (ref 8.4–10.5)
CHLORIDE SERPL-SCNC: 109 MMOL/L — HIGH (ref 96–108)
CHOLEST SERPL-MCNC: 112 MG/DL — SIGNIFICANT CHANGE UP
CO2 SERPL-SCNC: 24 MMOL/L — SIGNIFICANT CHANGE UP (ref 22–31)
CREAT SERPL-MCNC: 0.77 MG/DL — SIGNIFICANT CHANGE UP (ref 0.5–1.3)
CULTURE RESULTS: SIGNIFICANT CHANGE UP
DRVVT 50/50: 56.5 SEC — SIGNIFICANT CHANGE UP
DRVVT SCREEN TO CONFIRM RATIO: SIGNIFICANT CHANGE UP
ESTIMATED AVERAGE GLUCOSE: 140 MG/DL — HIGH (ref 68–114)
GLUCOSE SERPL-MCNC: 92 MG/DL — SIGNIFICANT CHANGE UP (ref 70–99)
HCT VFR BLD CALC: 27.8 % — LOW (ref 39–50)
HDLC SERPL-MCNC: 43 MG/DL — SIGNIFICANT CHANGE UP
HGB BLD-MCNC: 9 G/DL — LOW (ref 13–17)
LA NT DPL PPP QL: 63.7 SEC — SIGNIFICANT CHANGE UP
LIPID PNL WITH DIRECT LDL SERPL: 56 MG/DL — SIGNIFICANT CHANGE UP
MCHC RBC-ENTMCNC: 32.3 PG — SIGNIFICANT CHANGE UP (ref 27–34)
MCHC RBC-ENTMCNC: 32.4 GM/DL — SIGNIFICANT CHANGE UP (ref 32–36)
MCV RBC AUTO: 99.6 FL — SIGNIFICANT CHANGE UP (ref 80–100)
METHOD TYPE: SIGNIFICANT CHANGE UP
NON HDL CHOLESTEROL: 69 MG/DL — SIGNIFICANT CHANGE UP
NORMALIZED SCT PPP-RTO: 0.77 RATIO — SIGNIFICANT CHANGE UP (ref 0–1.16)
NORMALIZED SCT PPP-RTO: SIGNIFICANT CHANGE UP
NRBC # BLD: 0 /100 WBCS — SIGNIFICANT CHANGE UP (ref 0–0)
ORGANISM # SPEC MICROSCOPIC CNT: SIGNIFICANT CHANGE UP
ORGANISM # SPEC MICROSCOPIC CNT: SIGNIFICANT CHANGE UP
PLATELET # BLD AUTO: 37 K/UL — LOW (ref 150–400)
POTASSIUM SERPL-MCNC: 3.6 MMOL/L — SIGNIFICANT CHANGE UP (ref 3.5–5.3)
POTASSIUM SERPL-SCNC: 3.6 MMOL/L — SIGNIFICANT CHANGE UP (ref 3.5–5.3)
RBC # BLD: 2.79 M/UL — LOW (ref 4.2–5.8)
RBC # FLD: 15.6 % — HIGH (ref 10.3–14.5)
SODIUM SERPL-SCNC: 142 MMOL/L — SIGNIFICANT CHANGE UP (ref 135–145)
SPECIMEN SOURCE: SIGNIFICANT CHANGE UP
TRIGL SERPL-MCNC: 63 MG/DL — SIGNIFICANT CHANGE UP
WBC # BLD: 4.71 K/UL — SIGNIFICANT CHANGE UP (ref 3.8–10.5)
WBC # FLD AUTO: 4.71 K/UL — SIGNIFICANT CHANGE UP (ref 3.8–10.5)

## 2022-01-14 PROCEDURE — 99223 1ST HOSP IP/OBS HIGH 75: CPT

## 2022-01-14 RX ORDER — ENOXAPARIN SODIUM 100 MG/ML
120 INJECTION SUBCUTANEOUS
Refills: 0 | Status: DISCONTINUED | OUTPATIENT
Start: 2022-01-14 | End: 2022-01-15

## 2022-01-14 RX ADMIN — CEFTRIAXONE 100 MILLIGRAM(S): 500 INJECTION, POWDER, FOR SOLUTION INTRAMUSCULAR; INTRAVENOUS at 18:46

## 2022-01-14 RX ADMIN — ENOXAPARIN SODIUM 120 MILLIGRAM(S): 100 INJECTION SUBCUTANEOUS at 18:46

## 2022-01-14 NOTE — PROGRESS NOTE ADULT - ASSESSMENT
76yr M hx of prior strokes, prostate CA on chemo, recent DVT diagnosis requiring thrombectomy , on xarelto p/w AMS.  r/o stroke.  (+) UA    ER vss, afebrile.  No leukocytosis.      UTI:    - p/w AMS.  UA (+)  - f/u ucx >100K E.coli, sensitive  - Cont rocephin  - If pt febrile, send bcx x 2.    Thrombotic stroke:    - A/w AMS.  CT brain with subacute post cerebral thrombosis    - Neuro f/u appreciated.  MRI shows ACUTE TO SUBACUTE LEFT PCA INFARCT and cardiac w/u in progress    - LE doppler (+) dvt - on full dose lovenox      * Complete total 7 days abx. Can change to ceftin 250mg po bid to complete course.       Dr. Marietta Shrestha covering 1/15 and 1/16.  [phone]

## 2022-01-14 NOTE — PROGRESS NOTE ADULT - ASSESSMENT
76 year old obese  male  with  HTN, prostate cancer diagnosed 6/2019 s/p prostectemy, metastaitc hepatic carcinoma dx Nov 2019 on chemotherapy, recent DVT diagnosis requiring thrombectomy on xarelto, presented to the ED from home as a code stroke for AMS.   was confused from night prior   Pt is not a TPA candidate as he's outside window.   Not a thrombectomy candidate as no LVO seen on imaging.    BP on scene 148/84 glucose 164  NIHSS 4 (no age, could not name objects- mild aphasia, R complete hemianopia) on arrival.   preMRS 0  o/e 1/12 AAOx2-3, RHHA (chronic)     CTH with chronic appearing right occipital infarct; but also newer Subacute left posterior cerebral artery infarct.   CTA with Occlusion of the distal left posterior cerebral artery    UA positive.   + thrombocytopenia to 40s  elevated alk phos   MRI brain with subacute L PCA infarct noted   LE with multiple DVTs   TTE with no evidence of PFO  LE doppler +_ DVTs  1/14, patient comfertable     Impression: AMS may 2/2 possible toxic/metabolic/infectious etiology (UTI). subacute stroke on imaging seems to be embolic, ESUS, may be hypercoag from malignancy vs cardiac source  - back on xarelto for DVT. no objection from stroke standpoint.  consider Full dose lovenox given malignancy history.  monitor with thrombocytopenia.  f/u heme/onc but consider holding unless platelets consistantly > 50k  - lipitor 40 for secondary stroke prevention if no contraindication. does have elevated alk phos, held given liver Ca   - send APLS labs, beta 2 glycoprotein, lupus anticoagulant, cardiolipin Abs.  may be altered now that he is on DOAC  - Hemoglobin A1c and lipid panel    - ceftriaxone for UTI, Ucx with E.coli   - no objection to optho. non urgent.  B/L occipital infarcts can cause visual loss   - check blood cx. r/o bacteremia   - telemetry  - PT/OT/SS/SLP, OOBC  - BP normotensive. stroke is subacute   - onc eval noted.   - check FS, glucose control <180  - GI/DVT ppx  - Thank you for allowing me to participate in the care of this patient. Call with questions.   - attempted to call wife Margo at [phone] but no answer. office number left will call back today.   -Yosi Blancas MD  Vascular Neurology  Office: [phone] .      76 year old obese  male  with  HTN, prostate cancer diagnosed 6/2019 s/p prostectemy, metastaitc hepatic carcinoma dx Nov 2019 on chemotherapy, recent DVT diagnosis requiring thrombectomy on xarelto, presented to the ED from home as a code stroke for AMS.   was confused from night prior   Pt is not a TPA candidate as he's outside window.   Not a thrombectomy candidate as no LVO seen on imaging.    BP on scene 148/84 glucose 164  NIHSS 4 (no age, could not name objects- mild aphasia, R complete hemianopia) on arrival.   preMRS 0  o/e 1/12 AAOx2-3, RHHA (chronic)     CTH with chronic appearing right occipital infarct; but also newer Subacute left posterior cerebral artery infarct.   CTA with Occlusion of the distal left posterior cerebral artery    UA positive.   + thrombocytopenia to 40s  elevated alk phos   MRI brain with subacute L PCA infarct noted   LE with multiple DVTs   TTE with no evidence of PFO  LE doppler +_ DVTs  1/14, patient comfertable     Impression: AMS may 2/2 possible toxic/metabolic/infectious etiology (UTI). subacute stroke on imaging seems to be embolic, ESUS, may be hypercoag from malignancy vs cardiac source  - back on xarelto for DVT. no objection from stroke standpoint.  consider Full dose lovenox given malignancy history.  monitor with thrombocytopenia.  f/u heme/onc but consider holding unless platelets consistantly > 50k  - lipitor 40 for secondary stroke prevention if no contraindication. does have elevated alk phos, held given liver Ca   - send APLS labs, beta 2 glycoprotein, lupus anticoagulant, cardiolipin Abs.  may be altered now that he is on DOAC  - Hemoglobin A1c and lipid panel    - ceftriaxone for UTI, Ucx with E.coli   - no objection to optho. non urgent.  B/L occipital infarcts can cause visual loss   - check blood cx. r/o bacteremia   - telemetry  - PT/OT/SS/SLP, OOBC  - BP normotensive. stroke is subacute   - onc eval noted.   - check FS, glucose control <180  - GI/DVT ppx  - Thank you for allowing me to participate in the care of this patient. Call with questions.   - attempted to call wife Trisha at [phone] but no answer. office number left will call back today.   - spoke with wife trisha at her home number [phone] on 1/14. all questions answered  -Yosi Blancas MD  Vascular Neurology  Office: [phone] .

## 2022-01-14 NOTE — CONSULT NOTE ADULT - ATTENDING COMMENTS
Events that transpired leading to the patient's hospitalization were reviewed.  The patient's past medical history/past surgical history denies family history social history was gone over.  Agree with physical examination as noted above.    76-year-old male with a past medical history significant for CVA, DVT (left greater than right on Xarelto), prostate cancer, hepatobiliary cancer who presented with altered mental status he was found to have a subacute infarct on CT scan and urinary tract infection.  A lower extremity duplex demonstrated bilateral DVT (question acute/subacute/chronic).  Discussion was had with the patient he reports that he may have missed some doses of his anticoagulant over the last few weeks/months.  He has been on Xarelto for approximately 1 year.  Denies any bleeding issues.  No blood in his stool or urine.  Per report a few weeks ago at outpatient lower extremity venous duplex did not demonstrate any DVT.  Left leg is swollen.    If the patient is cleared from a hematologic perspective would recommend that he be transition to Lovenox 1 mg/kilogram twice daily.  Would closely monitor for any signs and symptoms of bleeding.  Would recommend that repeat lower extremity venous duplex study be performed in approximately 1 week.  Closely monitor for signs and symptoms of bleeding.    The patient has acute left leg lower extremity edema.  He notes that he has had this before.  It is recommended that his left leg be wrapped and elevated.  Closely monitor for any changes in lower extremity sensation, pulses or worsening in swelling.    TTE demonstrated normal left ventricular internal diameter/wall thickness with hyperdynamic left ventricular systolic function.  No left ventricular thrombus with normal right ventricular size and function.  No evidence of a patent foramen ovale.    Findings and plan were discussed with medicine team/Dr. Ndiaye.    All question concerns of the patient and his wife who was called on the phone were addressed.    EKG, laboratory studies and imaging signs were present reviewed.
code stroke cand neurology emergnetly assessed patient.  Briefly   76 year old obese  male  with  HTN, prostate cancer diagnosed 6/2019 s/p prostectemy, hepatic carcinoma dx Nov 2019 on chemotherapy, recent DVT diagnosis requiring thrombectomy on xarelto, presented to the ED from home as a code stroke for AMS.   was confused from night prior   Pt is not a TPA candidate as he's outside window.   Not a thrombectomy candidate as no LVO seen on imaging.    BP on scene 148/84 glucose 164  NIHSS 4 (no age, could not name objects- mild aphasia, R complete hemianopia) on arrival.   preMRS 0  o/e 1/12 AAOx2-3, RHHA (chronic)     CTH with chronic appearing right occipital infarct; but also newer Subacute left posterior cerebral artery infarct.   CTA with Occlusion of the distal left posterior cerebral artery    UA positive.   + thrombocytopenia to 40s  elevated alk phos     Impression: AMS may 2/2 possible toxic/metabolic/infectious etiology (UTI). subacute stroke on imaging seems to be embolic, ESUS, may be hypercoag from malignancy vs cardiac source  - back on xarelto for DVT. no objection from stroke standpoint.  consider Full dose lovenox given malignancy history.  monitor with thrombocytopenia.  f/u heme/onc but consider holding unless platelets consistantly > 50k  - lipitor 40 for secondary stroke prevention if no contraindication. does have elevated alk phos  - send APLS labs, beta 2 glycoprotein, lupus anticoagulant, cardiolipin Abs.  may be altered now that he is on DOAC  - MRI brain w/ and w/o   - Hemoglobin A1c and lipid panel  - ceftriaxone for UTI  - check blood cx. r/o bacteremia   - TTE  - telemetry  - PT/OT/SS/SLP, OOBC  - BP normotensive. stroke is subacute   - check FS, glucose control <180  - GI/DVT ppx  - Counseling on diet, exercise, and medication adherence was done  - Counseling on smoking cessation and alcohol consumption offered when appropriate.  - Pain assessed and judicious use of narcotics when appropriate was discussed.    - Stroke education given when appropriate.  - Importance of fall prevention discussed.   - Differential diagnosis and plan of care discussed with patient and/or family and primary team  - Thank you for allowing me to participate in the care of this patient. Call with questions.   Yosi Blancas MD  Vascular Neurology  Office: [phone]

## 2022-01-14 NOTE — CONSULT NOTE ADULT - CONSULT REQUESTED DATE/TIME
11-Jan-2022 09:09
11-Jan-2022 11:15
14-Jan-2022
11-Jan-2022 20:00
14-Jan-2022 11:58
11-Jan-2022 17:18

## 2022-01-14 NOTE — CONSULT NOTE ADULT - SUBJECTIVE AND OBJECTIVE BOX
[facility] DEPARTMENT OF OPHTHALMOLOGY - INITIAL ADULT CONSULT  -----------------------------------------------------------------------------  Lakeisha Shipley MD, MPH, PGY-3  Pager: [phone]  -----------------------------------------------------------------------------    HPI: 76yr M hx of prior strokes, prostate CA on chemo, recent DVT diagnosis requiring thrombectomy , on xarelto p/w AMS. per wife, is being confused increasingly since last night. continued to mistake places at home and complaining of not being able to see the TV however was looking at other objects. Pt presented as stroke alert, found to have old right occipital infarct, new infarct PCA territory involving thalamus, left occipital lobe and thalamus.     Ophtho consulted for blurred vision. Pt denies blurred vision, but reports that he cannot see anything on the right side of his visual field. Symptoms started at the time of presentation, one day prior to admission. Denies diplopia.   	  PMH: per HPI   POcHx: denies surg/laser  FH: denies glc/amd  Social History: denies etoh/tobacco  Ophthalmic Medications: none  Allergies: NKDA    Review of Systems:  Constitutional: No fever, chills  Eyes: No blurry vision, flashes, floaters, FBS, erythema, discharge, double vision, OU  Neuro: No tremors  Cardiovascular: No chest pain, palpitations  Respiratory: No SOB, no cough  GI: No nausea, vomiting, abdominal pain  : No dysuria  Skin: no rash  Psych: no depression  Endocrine: no polyuria, polydipsia  Heme/lymph: no swelling    VITALS: T(C): 36.8 (01-14-22 @ 11:14)  T(F): 98.2 (01-14-22 @ 11:14), Max: 98.6 (01-14-22 @ 04:22)  HR: 71 (01-14-22 @ 11:14) (71 - 72)  BP: 106/68 (01-14-22 @ 11:14) (106/68 - 124/70)  RR:  (18 - 18)  SpO2:  (94% - 98%)  Wt(kg): --  General: AAO x 3, appropriate mood and affect    Ophthalmology Exam:  Visual acuity (cc): 20/40 OD, 20/30 OS  Pupils: PERRL OU, no APD  Ttono: 14, 15  Extraocular movements (EOMs): Full OU, no pain, no diplopia - torsional nystagmus OU  Confrontational Visual Field (CVF): Right-sided homonymous hemianopsia    Color Plates: only able to see control plate OU     Pen Light Exam (PLE)  External: Flat OU  Lids/Lashes/Lacrimal Ducts: Flat OU    Sclera/Conjunctiva: W+Q OU  Cornea: Cl OU  Anterior Chamber: D+F OU    Iris: Flat OU  Lens: NS OU     Fundus Exam: dilated with 1% tropicamide and 2.5% phenylephrine  Approval obtained from primary team for dilation  Patient aware that pupils can remained dilated for at least 4-6 hours  Exam performed with 20D lens    Vitreous: wnl OU  Disc, cup/disc: sharp and pink, 0.4 OU  Macula: wnl OU  Vessels: wnl OU  Periphery: wnl OU    Labs/Imaging:    ACC: [id number] EXAM:  MR BRAIN                          PROCEDURE DATE:  01/12/2022      INTERPRETATION:  MR BRAIN    Clinical information: 76 yrs old male r/o cva    r/o cva     A noncontrast MRI of the brain was performed.    TECHNIQUE:  In the sagittal plane a T1 weighted sequence was performed.  In the axial   plane T1, T2, FLAIR, GRE, and diffusion weighted imaging was obtained.    COMPARISON:  Exam is compared to head CT of January 11, 2022.    FINDINGS:  BRAIN PARENCHYMA:  There is mild to moderate atrophy. An acute to subacute left PCA infarct   is confirmed. There is involvement of the left thalamus as well as the   medial aspect of the left temporal and left occipital lobes. No   associated blood products. Regional mass effect.    Chronic small right occipital and very small right parietal infarcts are   present. Chronic lacunar infarcts involve the bilateral basal ganglia.   There are mild to moderate chronic periventricular and subcortical white   matter ischemic changes..    VENTRICLES:  There is no hydrocephalus.    EXTRA-AXIAL:  No extra-axial mass.  No evidence of a subdural or epidural collection.    Patent basal cisterns.    OTHER:  Mild mucosal thickening is present within the ethmoid sinuses. Dental   artifact is present.    IMPRESSION:  ACUTE TO SUBACUTE LEFT PCA INFARCT IS CONFIRMED. NO ASSOCIATED BLOOD   PRODUCTS.    --- End of Report ---    VIN DIXON MD; Attending Radiologist  This document has been electronically signed. Jan 13 2022  8:47AM    ACC: [id number] EXAM:  CT BRAIN STROKE PROTOCOL                        ACC: [id number] EXAM:  CT ANGIO BRAIN (W)AW IC                        ACC: [id number] EXAM:  CT ANGIO NECK (W)AW IC                          PROCEDURE DATE:  01/11/2022        INTERPRETATION:  Clinical Indication: Code stroke for altered mental   status, dizziness, unsteady gait, prostate cancer    5mm axial sections of the brain were obtained from base to vertex,   without the intravenous administration of contrast material. Coronal and   sagittal computer generated reconstructed are available.    No prior brain imaging is available for comparison.    The ventricles and sulci are prominent consistent age appropriate   involutional changes. Small vessel white matter ischemic changes are   noted. There is no hemorrhage. There is an old right occipital infarct.   Lucency in the left medial temporal occipital region is identified which   has the appearance of a subacute left posterior cerebral artery infarct   with sulcal effacement.    After the intravenous power injection of 70 cc of Omnipaque 300 using a   bolus munira timing run serial thin sections were obtained through the   neck from the thoracic inlet through the intracranial circulation   centered at the [id number] on a multislice CT scanner reformatted   with coronal and sagittal 2 D-MIP projections, including 3 D   reconstructions using a separate 3D Vitrea software workstation. A total   of 70  cc of Omnipaque were intravenously injected. 30  cc were discarded.    The origins of the carotid vertebral arteries are normal. The vertebral   arteries are codominant. The carotid bifurcations are unremarkable.    The distal vertebral arteries are well identified as are the   posterior-inferior cerebellar arteries bilaterally. The region of the   vertebral basilar junction is normal. The basilar artery is normal. The   right posterior cerebral and superior cerebellar arteries are normal.   There is occlusion of the distal left posterior cerebral artery.    Evaluation of the carotid arteries demonstrate normal appearance to the   distal cervical, petrous cavernous and supraclinoid internal carotid   arteries. The anterior cerebral arteries anterior communicating artery   and middle cerebral arteries are normal.    The normal intracranial venous circulation is identified. The transverse   sinuses are codominant. The superior sagittal sinus, internal cerebral   veins, vein of Shay, straight sinus, transverse sinuses, sigmoid sinuses   and internal jugular veins are normal. Cortical veins are normal.    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 Occlusion of the distal left posterior cerebral artery on   CTA.    Dr. Ambrose discussed these findings with neurology resident on 1/11/2022   9:14 AM with read back.    --- End of Report ---    DANIEL AMBROSE MD; Attending Radiologist  This document has been electronically signed. Jan 11 2022  9:27AM    Assessment and Recommendations:  76yr M hx of prior strokes, prostate CA on chemo, recent DVT diagnosis requiring thrombectomy , on xarelto p/w AMS. per wife, is being confused increasingly since last night. continued to mistake places at home and complaining of not being able to see the TV however was looking at other objects. Pt presented as stroke alert, found to have old right occipital infarct, new infarct PCA territory involving thalamus, left occipital lobe and thalamus.     #Right-sided homonymous hemianopsia   -         Findings discussed with pt and primary team    Outpatient follow-up: Patient should follow-up with his/her ophthalmologist or with [facility] Department of Ophthalmology within 1 week of after discharge at:    [address]  [place] [address]  [phone]    Lakeisha Shipley MD, MPH PGY-3  Pager: [phone]  Google Voice: [phone]   Also available on Microsoft Teams   [facility] DEPARTMENT OF OPHTHALMOLOGY - INITIAL ADULT CONSULT  -----------------------------------------------------------------------------  Lakeisha Shipley MD, MPH, PGY-3  Pager: [phone]  -----------------------------------------------------------------------------    HPI: 76yr M hx of prior strokes, prostate CA on chemo, recent DVT diagnosis requiring thrombectomy , on xarelto p/w AMS. per wife, is being confused increasingly since last night. continued to mistake places at home and complaining of not being able to see the TV however was looking at other objects. Pt presented as stroke alert, found to have old right occipital infarct, new infarct PCA territory involving thalamus, left occipital lobe and thalamus.     Ophtho consulted for blurred vision. Pt denies blurred vision, but reports that he cannot see anything on the right side of his visual field. Symptoms started at the time of presentation, one day prior to admission. Denies diplopia.   	  PMH: per HPI   POcHx: denies surg/laser  FH: denies glc/amd  Social History: denies etoh/tobacco  Ophthalmic Medications: none  Allergies: NKDA    Review of Systems:  Constitutional: No fever, chills  Eyes: No blurry vision, flashes, floaters, FBS, erythema, discharge, double vision, OU  Neuro: No tremors  Cardiovascular: No chest pain, palpitations  Respiratory: No SOB, no cough  GI: No nausea, vomiting, abdominal pain  : No dysuria  Skin: no rash  Psych: no depression  Endocrine: no polyuria, polydipsia  Heme/lymph: no swelling    VITALS: T(C): 36.8 (01-14-22 @ 11:14)  T(F): 98.2 (01-14-22 @ 11:14), Max: 98.6 (01-14-22 @ 04:22)  HR: 71 (01-14-22 @ 11:14) (71 - 72)  BP: 106/68 (01-14-22 @ 11:14) (106/68 - 124/70)  RR:  (18 - 18)  SpO2:  (94% - 98%)  Wt(kg): --  General: AAO x 3, appropriate mood and affect    Ophthalmology Exam:  Visual acuity (cc): 20/40 OD, 20/30 OS  Pupils: PERRL OU, no APD  Ttono: 14, 15  Extraocular movements (EOMs): Full OU, no pain, no diplopia - torsional nystagmus OU  Confrontational Visual Field (CVF): Right-sided homonymous hemianopsia    Color Plates: only able to see control plate OU     Pen Light Exam (PLE)  External: Flat OU  Lids/Lashes/Lacrimal Ducts: Flat OU    Sclera/Conjunctiva: W+Q OU  Cornea: Cl OU  Anterior Chamber: D+F OU    Iris: Flat OU  Lens: NS OU     Fundus Exam: dilated with 1% tropicamide and 2.5% phenylephrine  Approval obtained from primary team for dilation  Patient aware that pupils can remained dilated for at least 4-6 hours  Exam performed with 20D lens    Vitreous: wnl OU  Disc, cup/disc: sharp and pink, 0.4 OU  Macula: wnl OU  Vessels: wnl OU  Periphery: wnl OU    Labs/Imaging:    ACC: [id number] EXAM:  MR BRAIN                          PROCEDURE DATE:  01/12/2022      INTERPRETATION:  MR BRAIN    Clinical information: 76 yrs old male r/o cva    r/o cva     A noncontrast MRI of the brain was performed.    TECHNIQUE:  In the sagittal plane a T1 weighted sequence was performed.  In the axial   plane T1, T2, FLAIR, GRE, and diffusion weighted imaging was obtained.    COMPARISON:  Exam is compared to head CT of January 11, 2022.    FINDINGS:  BRAIN PARENCHYMA:  There is mild to moderate atrophy. An acute to subacute left PCA infarct   is confirmed. There is involvement of the left thalamus as well as the   medial aspect of the left temporal and left occipital lobes. No   associated blood products. Regional mass effect.    Chronic small right occipital and very small right parietal infarcts are   present. Chronic lacunar infarcts involve the bilateral basal ganglia.   There are mild to moderate chronic periventricular and subcortical white   matter ischemic changes..    VENTRICLES:  There is no hydrocephalus.    EXTRA-AXIAL:  No extra-axial mass.  No evidence of a subdural or epidural collection.    Patent basal cisterns.    OTHER:  Mild mucosal thickening is present within the ethmoid sinuses. Dental   artifact is present.    IMPRESSION:  ACUTE TO SUBACUTE LEFT PCA INFARCT IS CONFIRMED. NO ASSOCIATED BLOOD   PRODUCTS.    --- End of Report ---    VIN DIXON MD; Attending Radiologist  This document has been electronically signed. Jan 13 2022  8:47AM    ACC: [id number] EXAM:  CT BRAIN STROKE PROTOCOL                        ACC: [id number] EXAM:  CT ANGIO BRAIN (W)AW IC                        ACC: [id number] EXAM:  CT ANGIO NECK (W)AW IC                          PROCEDURE DATE:  01/11/2022        INTERPRETATION:  Clinical Indication: Code stroke for altered mental   status, dizziness, unsteady gait, prostate cancer    5mm axial sections of the brain were obtained from base to vertex,   without the intravenous administration of contrast material. Coronal and   sagittal computer generated reconstructed are available.    No prior brain imaging is available for comparison.    The ventricles and sulci are prominent consistent age appropriate   involutional changes. Small vessel white matter ischemic changes are   noted. There is no hemorrhage. There is an old right occipital infarct.   Lucency in the left medial temporal occipital region is identified which   has the appearance of a subacute left posterior cerebral artery infarct   with sulcal effacement.    After the intravenous power injection of 70 cc of Omnipaque 300 using a   bolus munira timing run serial thin sections were obtained through the   neck from the thoracic inlet through the intracranial circulation   centered at the [id number]-[id number] on a multislice CT scanner reformatted   with coronal and sagittal 2 D-MIP projections, including 3 D   reconstructions using a separate 3D Vit[employer] software workstation. A total   of 70  cc of Omnipaque were intravenously injected. 30  cc were discarded.    The origins of the carotid vertebral arteries are normal. The vertebral   arteries are codominant. The carotid bifurcations are unremarkable.    The distal vertebral arteries are well identified as are the   posterior-inferior cerebellar arteries bilaterally. The region of the   vertebral basilar junction is normal. The basilar artery is normal. The   right posterior cerebral and superior cerebellar arteries are normal.   There is occlusion of the distal left posterior cerebral artery.    Evaluation of the carotid arteries demonstrate normal appearance to the   distal cervical, petrous cavernous and supraclinoid internal carotid   arteries. The anterior cerebral arteries anterior communicating artery   and middle cerebral arteries are normal.    The normal intracranial venous circulation is identified. The transverse   sinuses are codominant. The superior sagittal sinus, internal cerebral   veins, vein of Shay, straight sinus, transverse sinuses, sigmoid sinuses   and internal jugular veins are normal. Cortical veins are normal.    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 Occlusion of the distal left posterior cerebral artery on   CTA.    Dr. Ambrose discussed these findings with neurology resident on 1/11/2022   9:14 AM with read back.    --- End of Report ---    DANIEL AMBROSE MD; Attending Radiologist  This document has been electronically signed. Jan 11 2022  9:27AM    Assessment and Recommendations:  76yr M hx of prior strokes, prostate CA on chemo, recent DVT diagnosis requiring thrombectomy , on xarelto p/w AMS. per wife, is being confused increasingly since last night. continued to mistake places at home and complaining of not being able to see the TV however was looking at other objects. Pt presented as stroke alert, found to have old right occipital infarct, new infarct PCA territory involving thalamus, left occipital lobe and temporal lobe.    #Right-sided homonymous hemianopsia   - In s/o left occipital lobe infarct, including thalamus and temporal lobe   - Decreased color vision, torsional nystagmus explained by infarct distribution   - Instructed patient that he cannot drive at this time, will need outpatient follow-up for formal HVF testing and OCT nerve   - Appreciate neurology consult and recommendations re stroke work-up, needs medical optimization to prevent recurrent strokes as this occurred while patient was already on xarelto for DVT   - If any new visual changes per patient, please page on-call resident   - Patient's wife, Margo, called through patient services asking for update on ophthalmology exam - all questions and concerns answered, stated importance of stroke work-up and outpatient follow-up for further testing.     Case DW Dr. Mace, Neuro-Ophthalmology attending.     Findings discussed with pt and primary team    Outpatient follow-up: Patient should follow-up with his/her ophthalmologist or with [facility] Department of Ophthalmology within 1 week of after discharge at:    [address]  [place] [address]  [phone]    Lakeisha Shipley MD, MPH PGY-3  Pager: [phone]  Google Voice: [phone]   Also available on Microsoft Teams

## 2022-01-14 NOTE — CONSULT NOTE ADULT - SUBJECTIVE AND OBJECTIVE BOX
For all Cardiology service contact information, go to [email or web address] and use "[email or web address]" to login.    HPI:    76yr M hx of prior strokes, prostate CA on chemo, recent DVT diagnosis requiring thrombectomy, on xarelto presented with AMS likely 2.2 stroke. Patient was found to have multiple subacute infarcts and LE duplex redemonstratd DVT.     PAST MEDICAL & SURGICAL HISTORY:  CVA (cerebral vascular accident)    Deep vein thrombosis (DVT)    Prostate CA    Hepatobiliary cancer      SHx: smoking [ ], alcohol [ ], recreational drugs [ ]     Cardiology Data:  -------------------------------------------------------------------------------------------  ROS:  CV: chest pain (-), palpitation (-), orthopnea (-), PND (-), edema (-)  PULM: SOB (-), cough (-), wheezing (-), hemoptysis (-).   CONST: fever (-), chills (-) or fatigue (-)  GI: abdominal distension (-), abdominal pain (-) , nausea/vomiting (-), hematemesis, (-), melena (-), hematochezia (-)  : dysuria (-), frequency (-), hematuria (-).   NEURO: numbness (-), weakness (-), dizziness (-)  SKIN: itching (-), rash (-)  HEENT:  visual changes (-); vertigo or throat pain (-);  neck stiffness (-)     All other review of systems is negative unless indicated above.   -------------------------------------------------------------------------------------------  VS:  T(C): 36.8 (01-14-22 @ 11:14), Max: 37 (01-14-22 @ 04:22)  HR: 71 (01-14-22 @ 11:14) (71 - 76)  BP: 106/68 (01-14-22 @ 11:14) (106/68 - 124/70)  RR: 18 (01-14-22 @ 11:14) (18 - 18)  SpO2: 94% (01-14-22 @ 11:14) (94% - 98%)  I&O's Summary    13 Jan 2022 07:01  -  14 Jan 2022 07:00  --------------------------------------------------------  IN: 300 mL / OUT: 650 mL / NET: -350 mL      -------------------------------------------------------------------------------------------  EXAM:   PHYSICAL EXAM:  GENERAL: NAD, lying in bed comfortably  HEAD:  Atraumatic, Normocephalic  EYES: EOMI, PERRLA, conjunctiva and sclera clear  ENT: Moist mucous membranes  NECK: Supple, No JVD  CHEST/LUNG: Clear to auscultation bilaterally; No rales, rhonchi, wheezing, or rubs. Unlabored respirations  HEART: Regular rate and rhythm; No murmurs, rubs, or gallops  ABDOMEN: Bowel sounds present; Soft, Nontender, Nondistended.   EXTREMITIES:  2+ Peripheral Pulses, brisk capillary refill. There is bilateral edema, L > R   NERVOUS SYSTEM:  Alert & Oriented X3, speech clear. No deficits   MSK: FROM all 4 extremities, full and equal strength  SKIN: Bilateral venous stasis on lower extremities.   -------------------------------------------------------------------------------------------  LABS:                        9.0    4.71  )-----------( 37       ( 14 Jan 2022 06:06 )             27.8      01-14    142  |  109<H>  |  16  ----------------------------<  92  3.6   |  24  |  0.77    Ca    8.2<L>      14 Jan 2022 06:06              Troponin trend:    -------------------------------------------------------------------------------------------  Current Meds:  cefTRIAXone   IVPB 1000 milliGRAM(s) IV Intermittent every 24 hours  sodium chloride 0.9%. 1000 milliLiter(s) IV Continuous <Continuous>    -------------------------------------------------------------------------------------------

## 2022-01-14 NOTE — PROGRESS NOTE ADULT - ASSESSMENT
77 yo male h/o cva, prostate ca, hepatobiliary ca, dvt on xarelto, here with AMS  r/o cva  found to have uti    AMS  pt with subacute infarct on CT  neuro f/u noted  MRI noted  echo noted  neuro f/u  on xarelto -- will change to lovenox given malignancy and new dvt    uti  ceftriax  f/u cult and sens  can change to ceftin on dc - 7 days total     prostate ca, hepatobiliary ca  onc eval noted  outpt chemo    dvt  on xarelto - dvt has recurred  vasc eval noted  change to lovenox bid     cont other home meds     d/w wife on the phone     PT     Advanced care planning was discussed with patient and family.  Advanced care planning forms were reviewed and discussed as appropriate.  Differential diagnosis and plan of care discussed with patient after the evaluation.   Pain assessed and judicious use of narcotics when appropriate was discussed.  Importance of Fall prevention discussed.  Counseling on Smoking and Alcohol cessation was offered when appropriate.  Counseling on Diet, exercise, and medication compliance was done.       Approx 60 minutes spent. I have personally seen and examined this patient.  I have fully participated in the care of this patient. I have reviewed all pertinent clinical information, including history, physical exam, plan and the Resident’s note and agree except as noted.

## 2022-01-14 NOTE — CONSULT NOTE ADULT - ASSESSMENT
Assessment:  1. HTN  2. Prostate cancer diagnosed 6/2019 s/p prostectemy  3. Hepatic carcinoma dx Nov 2019 on chemotherapy  4. DVT   5. Subacute stroke.     Plan:  1. Start on Lovenox 1mg/kg bid if Neurology and Hematology is agreeable with anticoagulation.   2. Echo reviewed: no evidence of thrombus and no PFO      Thank you      Vascular Cardiology Service  DIRECT SERVICE NUMBER [phone]  Office [phone]  email:   [email or web address]

## 2022-01-15 ENCOUNTER — TRANSCRIPTION ENCOUNTER (OUTPATIENT)
Age: 77
End: 2022-01-15

## 2022-01-15 VITALS
DIASTOLIC BLOOD PRESSURE: 67 MMHG | HEART RATE: 70 BPM | OXYGEN SATURATION: 95 % | RESPIRATION RATE: 18 BRPM | TEMPERATURE: 98 F | SYSTOLIC BLOOD PRESSURE: 106 MMHG

## 2022-01-15 LAB
A1C WITH ESTIMATED AVERAGE GLUCOSE RESULT: 6.5 % — HIGH (ref 4–5.6)
ANION GAP SERPL CALC-SCNC: 10 MMOL/L — SIGNIFICANT CHANGE UP (ref 5–17)
BUN SERPL-MCNC: 17 MG/DL — SIGNIFICANT CHANGE UP (ref 7–23)
CALCIUM SERPL-MCNC: 8.4 MG/DL — SIGNIFICANT CHANGE UP (ref 8.4–10.5)
CHLORIDE SERPL-SCNC: 108 MMOL/L — SIGNIFICANT CHANGE UP (ref 96–108)
CHOLEST SERPL-MCNC: 111 MG/DL — SIGNIFICANT CHANGE UP
CO2 SERPL-SCNC: 23 MMOL/L — SIGNIFICANT CHANGE UP (ref 22–31)
CREAT SERPL-MCNC: 0.78 MG/DL — SIGNIFICANT CHANGE UP (ref 0.5–1.3)
ESTIMATED AVERAGE GLUCOSE: 140 MG/DL — HIGH (ref 68–114)
GLUCOSE SERPL-MCNC: 94 MG/DL — SIGNIFICANT CHANGE UP (ref 70–99)
HCT VFR BLD CALC: 29.5 % — LOW (ref 39–50)
HDLC SERPL-MCNC: 41 MG/DL — SIGNIFICANT CHANGE UP
HGB BLD-MCNC: 9.4 G/DL — LOW (ref 13–17)
LIPID PNL WITH DIRECT LDL SERPL: 59 MG/DL — SIGNIFICANT CHANGE UP
MCHC RBC-ENTMCNC: 31.9 GM/DL — LOW (ref 32–36)
MCHC RBC-ENTMCNC: 32.5 PG — SIGNIFICANT CHANGE UP (ref 27–34)
MCV RBC AUTO: 102.1 FL — HIGH (ref 80–100)
NON HDL CHOLESTEROL: 70 MG/DL — SIGNIFICANT CHANGE UP
NRBC # BLD: 0 /100 WBCS — SIGNIFICANT CHANGE UP (ref 0–0)
PLATELET # BLD AUTO: 40 K/UL — LOW (ref 150–400)
POTASSIUM SERPL-MCNC: 3.6 MMOL/L — SIGNIFICANT CHANGE UP (ref 3.5–5.3)
POTASSIUM SERPL-SCNC: 3.6 MMOL/L — SIGNIFICANT CHANGE UP (ref 3.5–5.3)
RBC # BLD: 2.89 M/UL — LOW (ref 4.2–5.8)
RBC # FLD: 16.1 % — HIGH (ref 10.3–14.5)
SODIUM SERPL-SCNC: 141 MMOL/L — SIGNIFICANT CHANGE UP (ref 135–145)
TRIGL SERPL-MCNC: 53 MG/DL — SIGNIFICANT CHANGE UP
WBC # BLD: 4.8 K/UL — SIGNIFICANT CHANGE UP (ref 3.8–10.5)
WBC # FLD AUTO: 4.8 K/UL — SIGNIFICANT CHANGE UP (ref 3.8–10.5)

## 2022-01-15 PROCEDURE — 87186 SC STD MICRODIL/AGAR DIL: CPT

## 2022-01-15 PROCEDURE — 84484 ASSAY OF TROPONIN QUANT: CPT

## 2022-01-15 PROCEDURE — 85025 COMPLETE CBC W/AUTO DIFF WBC: CPT

## 2022-01-15 PROCEDURE — 99291 CRITICAL CARE FIRST HOUR: CPT | Mod: 25

## 2022-01-15 PROCEDURE — 80053 COMPREHEN METABOLIC PANEL: CPT

## 2022-01-15 PROCEDURE — 85027 COMPLETE CBC AUTOMATED: CPT

## 2022-01-15 PROCEDURE — 87086 URINE CULTURE/COLONY COUNT: CPT

## 2022-01-15 PROCEDURE — 70450 CT HEAD/BRAIN W/O DYE: CPT | Mod: MA

## 2022-01-15 PROCEDURE — 70551 MRI BRAIN STEM W/O DYE: CPT

## 2022-01-15 PROCEDURE — 82962 GLUCOSE BLOOD TEST: CPT

## 2022-01-15 PROCEDURE — 84295 ASSAY OF SERUM SODIUM: CPT

## 2022-01-15 PROCEDURE — 82947 ASSAY GLUCOSE BLOOD QUANT: CPT

## 2022-01-15 PROCEDURE — 96374 THER/PROPH/DIAG INJ IV PUSH: CPT

## 2022-01-15 PROCEDURE — 70496 CT ANGIOGRAPHY HEAD: CPT | Mod: MA

## 2022-01-15 PROCEDURE — 83036 HEMOGLOBIN GLYCOSYLATED A1C: CPT

## 2022-01-15 PROCEDURE — 85730 THROMBOPLASTIN TIME PARTIAL: CPT

## 2022-01-15 PROCEDURE — 96375 TX/PRO/DX INJ NEW DRUG ADDON: CPT

## 2022-01-15 PROCEDURE — U0005: CPT

## 2022-01-15 PROCEDURE — 36415 COLL VENOUS BLD VENIPUNCTURE: CPT

## 2022-01-15 PROCEDURE — 86803 HEPATITIS C AB TEST: CPT

## 2022-01-15 PROCEDURE — 81001 URINALYSIS AUTO W/SCOPE: CPT

## 2022-01-15 PROCEDURE — 84443 ASSAY THYROID STIM HORMONE: CPT

## 2022-01-15 PROCEDURE — U0003: CPT

## 2022-01-15 PROCEDURE — 85014 HEMATOCRIT: CPT

## 2022-01-15 PROCEDURE — 82330 ASSAY OF CALCIUM: CPT

## 2022-01-15 PROCEDURE — 82803 BLOOD GASES ANY COMBINATION: CPT

## 2022-01-15 PROCEDURE — 70498 CT ANGIOGRAPHY NECK: CPT | Mod: MA

## 2022-01-15 PROCEDURE — 80061 LIPID PANEL: CPT

## 2022-01-15 PROCEDURE — 80048 BASIC METABOLIC PNL TOTAL CA: CPT

## 2022-01-15 PROCEDURE — 85610 PROTHROMBIN TIME: CPT

## 2022-01-15 PROCEDURE — C8929: CPT

## 2022-01-15 PROCEDURE — 97161 PT EVAL LOW COMPLEX 20 MIN: CPT

## 2022-01-15 PROCEDURE — 84132 ASSAY OF SERUM POTASSIUM: CPT

## 2022-01-15 PROCEDURE — 83605 ASSAY OF LACTIC ACID: CPT

## 2022-01-15 PROCEDURE — 82435 ASSAY OF BLOOD CHLORIDE: CPT

## 2022-01-15 PROCEDURE — 85018 HEMOGLOBIN: CPT

## 2022-01-15 PROCEDURE — 93970 EXTREMITY STUDY: CPT

## 2022-01-15 PROCEDURE — 71045 X-RAY EXAM CHEST 1 VIEW: CPT

## 2022-01-15 RX ORDER — CEFUROXIME AXETIL 250 MG
1 TABLET ORAL
Qty: 0 | Refills: 0 | DISCHARGE
Start: 2022-01-15

## 2022-01-15 RX ORDER — ENOXAPARIN SODIUM 100 MG/ML
120 INJECTION SUBCUTANEOUS
Qty: 28 | Refills: 0
Start: 2022-01-15 | End: 2022-01-28

## 2022-01-15 RX ORDER — CEFUROXIME AXETIL 250 MG
250 TABLET ORAL EVERY 12 HOURS
Refills: 0 | Status: DISCONTINUED | OUTPATIENT
Start: 2022-01-15 | End: 2022-01-15

## 2022-01-15 RX ORDER — RIVAROXABAN 15 MG-20MG
1 KIT ORAL
Qty: 0 | Refills: 0 | DISCHARGE

## 2022-01-15 RX ORDER — CEFUROXIME AXETIL 250 MG
1 TABLET ORAL
Qty: 6 | Refills: 0
Start: 2022-01-15 | End: 2022-01-17

## 2022-01-15 RX ORDER — ENOXAPARIN SODIUM 100 MG/ML
120 INJECTION SUBCUTANEOUS
Qty: 0 | Refills: 0 | DISCHARGE
Start: 2022-01-15

## 2022-01-15 RX ADMIN — ENOXAPARIN SODIUM 120 MILLIGRAM(S): 100 INJECTION SUBCUTANEOUS at 17:19

## 2022-01-15 RX ADMIN — ENOXAPARIN SODIUM 120 MILLIGRAM(S): 100 INJECTION SUBCUTANEOUS at 05:09

## 2022-01-15 RX ADMIN — Medication 250 MILLIGRAM(S): at 17:19

## 2022-01-15 NOTE — PHYSICAL THERAPY INITIAL EVALUATION ADULT - GENERAL OBSERVATIONS, REHAB EVAL
pt bruce 35 min eval well. pt cleared with NP Ivis. pt rec'd in bed, tele, NAD, A&Ox2, agreed to session. pt p/w R sided homohemianopsia. see initial evaluation document for functional assessment. pt p/w mild unsteadiness without a walker during ambulation. Pt left in chair, JESUS Zee aware, NAD, all lines intact, cb in reach, all needs met/5Ps.

## 2022-01-15 NOTE — PROGRESS NOTE ADULT - SUBJECTIVE AND OBJECTIVE BOX
Neurology Progress Note    S: Patient seen and examined. No new events overnight. patient denied CP, SOB, HA or pain.     Medication:  cefTRIAXone   IVPB 1000 milliGRAM(s) IV Intermittent every 24 hours  rivaroxaban 20 milliGRAM(s) Oral with dinner  sodium chloride 0.9%. 1000 milliLiter(s) IV Continuous <Continuous>      Vitals:  Vital Signs Last 24 Hrs  T(C): 36.4 (13 Jan 2022 12:17), Max: 36.8 (12 Jan 2022 22:04)  T(F): 97.6 (13 Jan 2022 12:17), Max: 98.2 (12 Jan 2022 22:04)  HR: 76 (13 Jan 2022 12:17) (60 - 76)  BP: 110/64 (13 Jan 2022 12:17) (110/50 - 139/75)  BP(mean): --  RR: 18 (13 Jan 2022 12:17) (18 - 18)  SpO2: 97% (13 Jan 2022 12:17) (96% - 99%)    General Exam:   General Appearance: Appropriately dressed and in no acute distress       Head: Normocephalic, atraumatic and no dysmorphic features  Ear, Nose, and Throat: Moist mucous membranes  CVS: S1S2+  Resp: No SOB, no wheeze or rhonchi  Abd: soft NTND  Extremities: No edema, no cyanosis  Skin: No bruises, no rashes     Neurological Exam:  Mental Status: Awake, alert and oriented x 2.  Able to follow simple  verbal commands. Able to name and repeat. fluent speech. No obvious aphasia minimal dysarthria noted.   Cranial Nerves: PERRL, , RHHA chronic, new LHHA. , sensation V1-V3 intact,  no obvious facial asymmetry , equal elevation of palate, scm/trap 5/5, tongue is midline on protrusion. no obvious papilledema on fundoscopic exam. Hearing is grossly intact.   Motor: Normal bulk, tone and strength throughout. Fine finger movements were intact and symmetric. no tremors or drift noted.    Sensation: Intact to light touch and pinprick throughout. no right/left confusion. no extinction to tactile on DSS.   Reflexes: 1+ throughout at biceps, brachioradialis, triceps, patellars and ankles bilaterally and equal. No clonus. R toe and L toe were both downgoing.  Coordination:deferred given visual deficit   Gait: deferred     I personally reviewed the below data/images/labs:      CBC Full  -  ( 13 Jan 2022 14:40 )  WBC Count : 7.52 K/uL  RBC Count : 2.83 M/uL  Hemoglobin : 9.2 g/dL  Hematocrit : 29.0 %  Platelet Count - Automated : 38 K/uL  Mean Cell Volume : 102.5 fl  Mean Cell Hemoglobin : 32.5 pg  Mean Cell Hemoglobin Concentration : 31.7 gm/dL  Auto Neutrophil # : x  Auto Lymphocyte # : x  Auto Monocyte # : x  Auto Eosinophil # : x  Auto Basophil # : x  Auto Neutrophil % : x  Auto Lymphocyte % : x  Auto Monocyte % : x  Auto Eosinophil % : x  Auto Basophil % : x    01-13    138  |  105  |  15  ----------------------------<  153<H>  4.0   |  26  |  0.81    Ca    8.1<L>      13 Jan 2022 14:40      < from: CT Angio Head w/ IV Cont (01.11.22 @ 09:15) >    ACC: [id number] EXAM:  CT BRAIN STROKE PROTOCOL                        ACC: [id number] EXAM:  CT ANGIO BRAIN (W)AW IC                        ACC: [id number] EXAM:  CT ANGIO NECK (W)AW IC                          PROCEDURE DATE:  01/11/2022          INTERPRETATION:  Clinical Indication: Code stroke for altered mental   status, dizziness, unsteady gait, prostate cancer    5mm axial sections of the brain were obtained from base to vertex,   without the intravenous administration of contrast material. Coronal and   sagittal computer generated reconstructed are available.    No prior brain imaging is available for comparison.          The ventricles and sulci are prominent consistent age appropriate   involutional changes. Small vessel white matter ischemic changes are   noted. There is no hemorrhage. There is an old right occipital infarct.   Lucency in the left medial temporal occipital region is identified which   has the appearance of a subacute left posterior cerebral artery infarct   with sulcal effacement.        After the intravenous power injection of 70 cc of Omnipaque 300 using a   bolus munira timing run serial thin sections were obtained through the   neck from the thoracic inlet through the intracranial circulation   centered at the [id number] on a multislice CT scanner reformatted   with coronal and sagittal 2 D-MIP projections, including 3 D   reconstructions using a separate 3D [place] [employer] software workstation. A total   of 70  cc of Omnipaque were intravenously injected. 30  cc were discarded.    The origins of the carotid vertebral arteries are normal. The vertebral   arteries are codominant. The carotid bifurcations are unremarkable.    The distal vertebral arteries are well identified as are the   posterior-inferior cerebellar arteries bilaterally. The region of the   vertebral basilar junction is normal. The basilar artery is normal. The   right posterior cerebral and superior cerebellar arteries are normal.   There is occlusion of the distal left posterior cerebral artery.    Evaluation of the carotid arteries demonstrate normal appearance to the   distal cervical, petrous cavernous and supraclinoid internal carotid   arteries. The anterior cerebral arteries anterior communicating artery   and middle cerebral arteries are normal.      The normal intracranial venous circulation is identified. The transverse   sinuses are codominant. The superior sagittal sinus, internal cerebral   veins, vein of Shay, straight sinus, transverse sinuses, sigmoid sinuses   and internal jugular veins are normal. Cortical veins are normal.    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 Occlusion of the distal left posterior cerebral artery on   CTA.    Dr. Ambrose discussed these findings with neurology resident on 1/11/2022   9:14 AM with read back.    --- End of Report ---            DANIEL AMBROSE MD; Attending Radiologist  This document has been electronically signed. Jan 11 2022  9:27AM    < end of copied text >  < from: MR Head No Cont (01.12.22 @ 18:31) >    ACC: [id number] EXAM:  MR BRAIN                          PROCEDURE DATE:  01/12/2022          INTERPRETATION:  MR BRAIN    Clinical information: 76 yrs old male r/o cva    r/o cva     A noncontrast MRI of the brain was performed.    TECHNIQUE:  In the sagittal plane a T1 weighted sequence was performed.  In the axial   plane T1, T2, FLAIR, GRE, and diffusion weighted imaging was obtained.    COMPARISON:  Exam is compared to head CT of January 11, 2022.    FINDINGS:  BRAIN PARENCHYMA:  There is mild to moderate atrophy. An acute to subacute left PCA infarct   is confirmed. There is involvement of the left thalamus as well as the   medial aspect of the left temporal and left occipital lobes. No   associated blood products. Regional mass effect.    Chronic small right occipital and very small right parietal infarcts are   present. Chronic lacunar infarcts involve the bilateral basal ganglia.   There are mild to moderate chronic periventricular and subcortical white   matter ischemic changes..    VENTRICLES:  There is no hydrocephalus.    EXTRA-AXIAL:  No extra-axial mass.  No evidence of a subdural or epidural collection.    Patent basal cisterns.    OTHER:  Mild mucosal thickening is present within the ethmoid sinuses. Dental   artifactis present.    IMPRESSION:  ACUTE TO SUBACUTE LEFT PCA INFARCT IS CONFIRMED. NO ASSOCIATED BLOOD   PRODUCTS.    --- End of Report ---            VIN DIXON MD; Attending Radiologist  This document has been electronically signed. Jan 13 2022  8:47AM    < end of copied text >        Conclusions:  1. Normal left ventricular internal dimensions and wall  thicknesses.  2. Hyperdynamic left ventricular systolic function.  Endocardial visualization enhanced with intravenous  injection of Ultrasonic Enhancing Agent (Definity). No left  ventricular thrombus.  3. Normal right ventricular size and function.  4. Agitated saline injection and color flow Doppler  demonstrates no evidence of a patent foramen ovale.  *** No previous Echo exam.
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
Infectious Diseases progress note:    Subjective: NAD, afebrile.  Ucx growing sensitive E.coli, already on rocephin    ROS:  CONSTITUTIONAL:  No fever, chills, rigors  CARDIOVASCULAR:  No chest pain or palpitations  RESPIRATORY:   No SOB, cough, dyspnea on exertion.  No wheezing  GASTROINTESTINAL:  No abd pain, N/V, diarrhea/constipation  EXTREMITIES:  No swelling or joint pain  GENITOURINARY:  No burning on urination, increased frequency or urgency.  No flank pain  NEUROLOGIC:  No HA, visual disturbances  SKIN: No rashes    Allergies    Allergy Status Unknown    Intolerances        ANTIBIOTICS/RELEVANT:  antimicrobials  cefTRIAXone   IVPB 1000 milliGRAM(s) IV Intermittent every 24 hours    immunologic:    OTHER:  enoxaparin Injectable 120 milliGRAM(s) SubCutaneous two times a day  sodium chloride 0.9%. 1000 milliLiter(s) IV Continuous <Continuous>      Objective:  Vital Signs Last 24 Hrs  T(C): 36.8 (14 Jan 2022 11:14), Max: 37 (14 Jan 2022 04:22)  T(F): 98.2 (14 Jan 2022 11:14), Max: 98.6 (14 Jan 2022 04:22)  HR: 71 (14 Jan 2022 11:14) (71 - 72)  BP: 106/68 (14 Jan 2022 11:14) (106/68 - 124/70)  BP(mean): --  RR: 18 (14 Jan 2022 11:14) (18 - 18)  SpO2: 94% (14 Jan 2022 11:14) (94% - 98%)    PHYSICAL EXAM:  Constitutional:NAD  Eyes:JUSTO, EOMI  Ear/Nose/Throat: no thrush, mucositis.  Moist mucous membranes	  Neck:no JVD, no lymphadenopathy, supple  Respiratory: CTA lili  Cardiovascular: S1S2 RRR, no murmurs  Gastrointestinal:soft, nontender,  nondistended (+) BS  Extremities:no e/e/c  Skin:  no rashes, open wounds or ulcerations        LABS:                        9.0    4.71  )-----------( 37       ( 14 Jan 2022 06:06 )             27.8     01-14    142  |  109<H>  |  16  ----------------------------<  92  3.6   |  24  |  0.77    Ca    8.2<L>      14 Jan 2022 06:06              MICROBIOLOGY:    Culture - Urine (01.11.22 @ 14:58)   - Amikacin: S <=16   - Amoxicillin/Clavulanic Acid: S <=8/4   - Ampicillin: S <=8 These ampicillin results predict results for amoxicillin   - Ampicillin/Sulbactam: S <=4/2 Enterobacter, Klebsiella aerogenes, Citrobacter, and Serratia may develop resistance during prolonged therapy (3-4 days)   - Aztreonam: S <=4   - Cefazolin: S <=2 ([id number]) For uncomplicated UTI with K. pneumoniae, E. coli, or P. mirablis: ROMAIN <=16 is sensitive and ROMAIN >=32 is resistant. This also predicts results for oral agents cefaclor, cefdinir, cefpodoxime, cefprozil, cefuroxime axetil, cephalexin and locarbef for uncomplicated UTI. Note that some isolates may be susceptible to these agents while testing resistant to cefazolin.   - Cefepime: S <=2   - Cefoxitin: S <=8   - Ceftriaxone: S <=1 Enterobacter, Klebsiella aerogenes, Citrobacter, and Serratia may develop resistance during prolonged therapy   - Ciprofloxacin: S <=0.25   - Ertapenem: S <=0.5   - Gentamicin: S <=2   - Imipenem: S <=1   - Levofloxacin: S <=0.5   - Meropenem: S <=1   - Nitrofurantoin: S <=32 Should not be used to treat pyelonephritis   - Piperacillin/Tazobactam: S <=8   - Tigecycline: S <=2   - Tobramycin: S <=2   - Trimethoprim/Sulfamethoxazole: S <=0.5/9.5   Specimen Source: Clean Catch Clean Catch (Midstream)   Culture Results:   >100,000 CFU/ml Escherichia coli   Organism Identification: Escherichia coli   Organism: Escherichia coli   Method Type: ROMAIN       RADIOLOGY & ADDITIONAL STUDIES:    
Infectious Diseases progress note:    Subjective: NAD, sitting up in bed.  Afebrile    ROS:  CONSTITUTIONAL:  No fever, chills, rigors  CARDIOVASCULAR:  No chest pain or palpitations  RESPIRATORY:   No SOB, cough, dyspnea on exertion.  No wheezing  GASTROINTESTINAL:  No abd pain, N/V, diarrhea/constipation  EXTREMITIES:  No swelling or joint pain  GENITOURINARY:  No burning on urination, increased frequency or urgency.  No flank pain  NEUROLOGIC:  No HA, visual disturbances  SKIN: No rashes    Allergies    Allergy Status Unknown    Intolerances        ANTIBIOTICS/RELEVANT:  antimicrobials  cefTRIAXone   IVPB 1000 milliGRAM(s) IV Intermittent every 24 hours    immunologic:    OTHER:  rivaroxaban 20 milliGRAM(s) Oral with dinner  sodium chloride 0.9%. 1000 milliLiter(s) IV Continuous <Continuous>      Objective:  Vital Signs Last 24 Hrs  T(C): 36.4 (13 Jan 2022 12:17), Max: 36.8 (12 Jan 2022 22:04)  T(F): 97.6 (13 Jan 2022 12:17), Max: 98.2 (12 Jan 2022 22:04)  HR: 76 (13 Jan 2022 12:17) (60 - 76)  BP: 110/64 (13 Jan 2022 12:17) (110/50 - 139/75)  BP(mean): --  RR: 18 (13 Jan 2022 12:17) (18 - 18)  SpO2: 97% (13 Jan 2022 12:17) (96% - 99%)    PHYSICAL EXAM:  Constitutional:NAD  Eyes:JUSTO, EOMI  Ear/Nose/Throat: no thrush, mucositis.  Moist mucous membranes	  Neck:no JVD, no lymphadenopathy, supple  Respiratory: CTA lili  Cardiovascular: S1S2 RRR, no murmurs  Gastrointestinal:soft, nontender,  nondistended (+) BS  Extremities:no e/e/c  Skin:  no rashes, open wounds or ulcerations        LABS:                        9.2    7.52  )-----------( 38       ( 13 Jan 2022 14:40 )             29.0     01-13    138  |  105  |  15  ----------------------------<  153<H>  4.0   |  26  |  0.81    Ca    8.1<L>      13 Jan 2022 14:40              MICROBIOLOGY:    Culture - Urine (01.11.22 @ 14:58)   Specimen Source: Clean Catch Clean Catch (Midstream)   Culture Results:   >100,000 CFU/ml Escherichia coli       RADIOLOGY & ADDITIONAL STUDIES:    < from: VA Duplex Lower Ext Vein Scan, Bilat (01.13.22 @ 13:35) >  FINDINGS:    RIGHT:  The right common femoral vein is patent and compressible. Wall thickening   is identified in the right femoral vein at the proximal thigh level.   There is thrombosis of the right femoral vein at the mid and distal thigh   levels as well as the popliteal vein and tibial peroneal trunk. The right   calf veins were not visualized.    LEFT:  The left common femoral vein and femoral vein at the proximal thigh level   demonstrate wall thickening. There is thrombosis of the left femoral vein   at the mid and distal thigh levels as well as the popliteal vein and   tibial peroneal trunk. The left calf veins were not visualized.    IMPRESSION:  There is thrombosis of the bilateral femoral, popliteal veins and tibial   peroneal trunks. The findings are of unknown chronicity as there is wall   thickening involving the left common femoral and bilateral femoralveins   at the proximal thigh level.    The calf veins were not visualized.    < end of copied text >      < from: MR Head No Cont (01.12.22 @ 18:31) >  FINDINGS:  BRAIN PARENCHYMA:  There is mild to moderate atrophy. An acute to subacute left PCA infarct   is confirmed. There is involvement of the left thalamus as well as the   medial aspect of the left temporal and left occipital lobes. No   associated blood products. Regional mass effect.    Chronic small right occipital and very small right parietal infarcts are   present. Chronic lacunar infarcts involve the bilateral basal ganglia.   There are mild to moderate chronic periventricular and subcortical white   matter ischemic changes..    VENTRICLES:  There is no hydrocephalus.    EXTRA-AXIAL:  No extra-axial mass.  No evidence of a subdural or epidural collection.    Patent basal cisterns.    OTHER:  Mild mucosal thickening is present within the ethmoid sinuses. Dental   artifactis present.    IMPRESSION:  ACUTE TO SUBACUTE LEFT PCA INFARCT IS CONFIRMED. NO ASSOCIATED BLOOD   PRODUCTS.    < end of copied text >      < from: Xray Chest 1 View- PORTABLE-Urgent (01.11.22 @ 09:39) >    INTERPRETATION:    Heart size and the mediastinum cannot be accurately evaluated on this   projection. Calcified thoracic aorta.  CT injectable right IJ approach port with tip in the SVC.  The included lungs are clear.  No pleural effusion or pneumothorax is seen.  There is osteoarthritic degenerative change of the spine.        IMPRESSION:  The included lungs are clear.    < end of copied text >  
Neurology Progress Note    S: Patient seen and examined. No new events overnight. patient denied CP, SOB, HA or pain.     Medication:  MEDICATIONS  (STANDING):  cefTRIAXone   IVPB 1000 milliGRAM(s) IV Intermittent every 24 hours  rivaroxaban 20 milliGRAM(s) Oral with dinner  sodium chloride 0.9%. 1000 milliLiter(s) (75 mL/Hr) IV Continuous <Continuous>    MEDICATIONS  (PRN):    Vitals:    Vital Signs Last 24 Hrs  T(C): 37 (01-14-22 @ 04:22), Max: 37 (01-14-22 @ 04:22)  T(F): 98.6 (01-14-22 @ 04:22), Max: 98.6 (01-14-22 @ 04:22)  HR: 71 (01-14-22 @ 04:22) (71 - 76)  BP: 107/59 (01-14-22 @ 04:22) (107/59 - 124/70)  BP(mean): --  RR: 18 (01-14-22 @ 04:22) (18 - 18)  SpO2: 95% (01-14-22 @ 04:22) (95% - 98%)        General Exam:   General Appearance: Appropriately dressed and in no acute distress       Head: Normocephalic, atraumatic and no dysmorphic features  Ear, Nose, and Throat: Moist mucous membranes  CVS: S1S2+  Resp: No SOB, no wheeze or rhonchi  Abd: soft NTND  Extremities: No edema, no cyanosis  Skin: No bruises, no rashes     Neurological Exam:  Mental Status: Awake, alert and oriented x 2-3.  Able to follow simple  verbal commands. Able to name and repeat. fluent speech. No obvious aphasia minimal dysarthria noted.   Cranial Nerves: PERRL, , RHHA chronic, mild new LHHA. , sensation V1-V3 intact,  no obvious facial asymmetry , equal elevation of palate, scm/trap 5/5, tongue is midline on protrusion. no obvious papilledema on fundoscopic exam. Hearing is grossly intact.   Motor: Normal bulk, tone and strength throughout. Fine finger movements were intact and symmetric. no tremors or drift noted.    Sensation: Intact to light touch and pinprick throughout. no right/left confusion. no extinction to tactile on DSS.   Reflexes: 1+ throughout at biceps, brachioradialis, triceps, patellars and ankles bilaterally and equal. No clonus. R toe and L toe were both downgoing.  Coordination: deferred given visual deficit   Gait: deferred     I personally reviewed the below data/images/labs:    CBC Full  -  ( 14 Jan 2022 06:06 )  WBC Count : 4.71 K/uL  RBC Count : 2.79 M/uL  Hemoglobin : 9.0 g/dL  Hematocrit : 27.8 %  Platelet Count - Automated : 37 K/uL  Mean Cell Volume : 99.6 fl  Mean Cell Hemoglobin : 32.3 pg  Mean Cell Hemoglobin Concentration : 32.4 gm/dL  Auto Neutrophil # : x  Auto Lymphocyte # : x  Auto Monocyte # : x  Auto Eosinophil # : x  Auto Basophil # : x  Auto Neutrophil % : x  Auto Lymphocyte % : x  Auto Monocyte % : x  Auto Eosinophil % : x  Auto Basophil % : x      01-14    142  |  109<H>  |  16  ----------------------------<  92  3.6   |  24  |  0.77    Ca    8.2<L>      14 Jan 2022 06:06            < from: CT Angio Head w/ IV Cont (01.11.22 @ 09:15) >    ACC: [id number] EXAM:  CT BRAIN STROKE PROTOCOL                        ACC: [id number] EXAM:  CT ANGIO BRAIN (W)AW IC                        ACC: [id number] EXAM:  CT ANGIO NECK (W)AW IC                          PROCEDURE DATE:  01/11/2022          INTERPRETATION:  Clinical Indication: Code stroke for altered mental   status, dizziness, unsteady gait, prostate cancer    5mm axial sections of the brain were obtained from base to vertex,   without the intravenous administration of contrast material. Coronal and   sagittal computer generated reconstructed are available.    No prior brain imaging is available for comparison.          The ventricles and sulci are prominent consistent age appropriate   involutional changes. Small vessel white matter ischemic changes are   noted. There is no hemorrhage. There is an old right occipital infarct.   Lucency in the left medial temporal occipital region is identified which   has the appearance of a subacute left posterior cerebral artery infarct   with sulcal effacement.        After the intravenous power injection of 70 cc of Omnipaque 300 using a   bolus munira timing run serial thin sections were obtained through the   neck from the thoracic inlet through the intracranial circulation   centered at the [id number] on a multislice CT scanner reformatted   with coronal and sagittal 2 D-MIP projections, including 3 D   reconstructions using a separate 3D [employer] software workstation. A total   of 70  cc of Omnipaque were intravenously injected. 30  cc were discarded.    The origins of the carotid vertebral arteries are normal. The vertebral   arteries are codominant. The carotid bifurcations are unremarkable.    The distal vertebral arteries are well identified as are the   posterior-inferior cerebellar arteries bilaterally. The region of the   vertebral basilar junction is normal. The basilar artery is normal. The   right posterior cerebral and superior cerebellar arteries are normal.   There is occlusion of the distal left posterior cerebral artery.    Evaluation of the carotid arteries demonstrate normal appearance to the   distal cervical, petrous cavernous and supraclinoid internal carotid   arteries. The anterior cerebral arteries anterior communicating artery   and middle cerebral arteries are normal.      The normal intracranial venous circulation is identified. The transverse   sinuses are codominant. The superior sagittal sinus, internal cerebral   veins, vein of Shay, straight sinus, transverse sinuses, sigmoid sinuses   and internal jugular veins are normal. Cortical veins are normal.    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 Occlusion of the distal left posterior cerebral artery on   CTA.    Dr. Ambrose discussed these findings with neurology resident on 1/11/2022   9:14 AM with read back.    --- End of Report ---            DANIEL AMBROSE MD; Attending Radiologist  This document has been electronically signed. Jan 11 2022  9:27AM    < end of copied text >  < from: MR Head No Cont (01.12.22 @ 18:31) >    ACC: [id number] EXAM:  MR BRAIN                          PROCEDURE DATE:  01/12/2022          INTERPRETATION:  MR BRAIN    Clinical information: 76 yrs old male r/o cva    r/o cva     A noncontrast MRI of the brain was performed.    TECHNIQUE:  In the sagittal plane a T1 weighted sequence was performed.  In the axial   plane T1, T2, FLAIR, GRE, and diffusion weighted imaging was obtained.    COMPARISON:  Exam is compared to head CT of January 11, 2022.    FINDINGS:  BRAIN PARENCHYMA:  There is mild to moderate atrophy. An acute to subacute left PCA infarct   is confirmed. There is involvement of the left thalamus as well as the   medial aspect of the left temporal and left occipital lobes. No   associated blood products. Regional mass effect.    Chronic small right occipital and very small right parietal infarcts are   present. Chronic lacunar infarcts involve the bilateral basal ganglia.   There are mild to moderate chronic periventricular and subcortical white   matter ischemic changes..    VENTRICLES:  There is no hydrocephalus.    EXTRA-AXIAL:  No extra-axial mass.  No evidence of a subdural or epidural collection.    Patent basal cisterns.    OTHER:  Mild mucosal thickening is present within the ethmoid sinuses. Dental   artifactis present.    IMPRESSION:  ACUTE TO SUBACUTE LEFT PCA INFARCT IS CONFIRMED. NO ASSOCIATED BLOOD   PRODUCTS.    --- End of Report ---            VIN DIXON MD; Attending Radiologist  This document has been electronically signed. Jan 13 2022  8:47AM    < end of copied text >        Conclusions:  1. Normal left ventricular internal dimensions and wall  thicknesses.  2. Hyperdynamic left ventricular systolic function.  Endocardial visualization enhanced with intravenous  injection of Ultrasonic Enhancing Agent (Definity). No left  ventricular thrombus.  3. Normal right ventricular size and function.  4. Agitated saline injection and color flow Doppler  demonstrates no evidence of a patent foramen ovale.  *** No previous Echo exam.
Neurology Progress Note    S: Patient seen and examined. No new events overnight. patient denied CP, SOB, HA or pain. dc plan     Medication:  MEDICATIONS  (STANDING):  cefuroxime   Tablet 250 milliGRAM(s) Oral every 12 hours  enoxaparin Injectable 120 milliGRAM(s) SubCutaneous two times a day  sodium chloride 0.9%. 1000 milliLiter(s) (75 mL/Hr) IV Continuous <Continuous>    MEDICATIONS  (PRN):      Vitals:  'Vital Signs Last 24 Hrs  T(C): 36.9 (15 Drake 2022 20:00), Max: 37.1 (15 Drkae 2022 04:30)  T(F): 98.4 (15 Drake 2022 20:00), Max: 98.7 (15 Drake 2022 04:30)  HR: 70 (15 Drake 2022 20:00) (68 - 74)  BP: 106/67 (15 Drake 2022 20:00) (99/63 - 114/62)  BP(mean): --  RR: 18 (15 Drake 2022 20:00) (18 - 18)  SpO2: 95% (15 Drake 2022 20:00) (95% - 95%)    General Exam:   General Appearance: Appropriately dressed and in no acute distress       Head: Normocephalic, atraumatic and no dysmorphic features  Ear, Nose, and Throat: Moist mucous membranes  CVS: S1S2+  Resp: No SOB, no wheeze or rhonchi  Abd: soft NTND  Extremities: No edema, no cyanosis  Skin: No bruises, no rashes     Neurological Exam:  Mental Status: Awake, alert and oriented x 2-3.  Able to follow simple  verbal commands. Able to name and repeat. fluent speech. No obvious aphasia minimal dysarthria noted.   Cranial Nerves: PERRL, , RHHA chronic, mild new LHHA. , sensation V1-V3 intact,  no obvious facial asymmetry , equal elevation of palate, scm/trap 5/5, tongue is midline on protrusion. no obvious papilledema on fundoscopic exam. Hearing is grossly intact.   Motor: Normal bulk, tone and strength throughout. Fine finger movements were intact and symmetric. no tremors or drift noted.    Sensation: Intact to light touch and pinprick throughout. no right/left confusion. no extinction to tactile on DSS.   Reflexes: 1+ throughout at biceps, brachioradialis, triceps, patellars and ankles bilaterally and equal. No clonus. R toe and L toe were both downgoing.  Coordination: deferred given visual deficit   Gait: deferred     I personally reviewed the below data/images/labs:  CBC Full  -  ( 15 Drake 2022 07:17 )  WBC Count : 4.80 K/uL  RBC Count : 2.89 M/uL  Hemoglobin : 9.4 g/dL  Hematocrit : 29.5 %  Platelet Count - Automated : 40 K/uL  Mean Cell Volume : 102.1 fl  Mean Cell Hemoglobin : 32.5 pg  Mean Cell Hemoglobin Concentration : 31.9 gm/dL  Auto Neutrophil # : x  Auto Lymphocyte # : x  Auto Monocyte # : x  Auto Eosinophil # : x  Auto Basophil # : x  Auto Neutrophil % : x  Auto Lymphocyte % : x  Auto Monocyte % : x  Auto Eosinophil % : x  Auto Basophil % : x    01-15    141  |  108  |  17  ----------------------------<  94  3.6   |  23  |  0.78    Ca    8.4      15 Drake 2022 07:17            < from: CT Angio Head w/ IV Cont (01.11.22 @ 09:15) >    ACC: [id number] EXAM:  CT BRAIN STROKE PROTOCOL                        ACC: [id number] EXAM:  CT ANGIO BRAIN (W)AW IC                        ACC: [id number] EXAM:  CT ANGIO NECK (W)AW IC                          PROCEDURE DATE:  01/11/2022          INTERPRETATION:  Clinical Indication: Code stroke for altered mental   status, dizziness, unsteady gait, prostate cancer    5mm axial sections of the brain were obtained from base to vertex,   without the intravenous administration of contrast material. Coronal and   sagittal computer generated reconstructed are available.    No prior brain imaging is available for comparison.          The ventricles and sulci are prominent consistent age appropriate   involutional changes. Small vessel white matter ischemic changes are   noted. There is no hemorrhage. There is an old right occipital infarct.   Lucency in the left medial temporal occipital region is identified which   has the appearance of a subacute left posterior cerebral artery infarct   with sulcal effacement.        After the intravenous power injection of 70 cc of Omnipaque 300 using a   bolus munira timing run serial thin sections were obtained through the   neck from the thoracic inlet through the intracranial circulation   centered at the [id number] on a multislice CT scanner reformatted   with coronal and sagittal 2 D-MIP projections, including 3 D   reconstructions using a separate 3D [employer] software workstation. A total   of 70  cc of Omnipaque were intravenously injected. 30  cc were discarded.    The origins of the carotid vertebral arteries are normal. The vertebral   arteries are codominant. The carotid bifurcations are unremarkable.    The distal vertebral arteries are well identified as are the   posterior-inferior cerebellar arteries bilaterally. The region of the   vertebral basilar junction is normal. The basilar artery is normal. The   right posterior cerebral and superior cerebellar arteries are normal.   There is occlusion of the distal left posterior cerebral artery.    Evaluation of the carotid arteries demonstrate normal appearance to the   distal cervical, petrous cavernous and supraclinoid internal carotid   arteries. The anterior cerebral arteries anterior communicating artery   and middle cerebral arteries are normal.      The normal intracranial venous circulation is identified. The transverse   sinuses are codominant. The superior sagittal sinus, internal cerebral   veins, vein of Shay, straight sinus, transverse sinuses, sigmoid sinuses   and internal jugular veins are normal. Cortical veins are normal.    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 Occlusion of the distal left posterior cerebral artery on   CTA.    Dr. Ambrose discussed these findings with neurology resident on 1/11/2022   9:14 AM with read back.    --- End of Report ---            DANIEL AMBROSE MD; Attending Radiologist  This document has been electronically signed. Jan 11 2022  9:27AM    < end of copied text >  < from: MR Head No Cont (01.12.22 @ 18:31) >    ACC: [id number] EXAM:  MR BRAIN                          PROCEDURE DATE:  01/12/2022          INTERPRETATION:  MR BRAIN    Clinical information: 76 yrs old male r/o cva    r/o cva     A noncontrast MRI of the brain was performed.    TECHNIQUE:  In the sagittal plane a T1 weighted sequence was performed.  In the axial   plane T1, T2, FLAIR, GRE, and diffusion weighted imaging was obtained.    COMPARISON:  Exam is compared to head CT of January 11, 2022.    FINDINGS:  BRAIN PARENCHYMA:  There is mild to moderate atrophy. An acute to subacute left PCA infarct   is confirmed. There is involvement of the left thalamus as well as the   medial aspect of the left temporal and left occipital lobes. No   associated blood products. Regional mass effect.    Chronic small right occipital and very small right parietal infarcts are   present. Chronic lacunar infarcts involve the bilateral basal ganglia.   There are mild to moderate chronic periventricular and subcortical white   matter ischemic changes..    VENTRICLES:  There is no hydrocephalus.    EXTRA-AXIAL:  No extra-axial mass.  No evidence of a subdural or epidural collection.    Patent basal cisterns.    OTHER:  Mild mucosal thickening is present within the ethmoid sinuses. Dental   artifactis present.    IMPRESSION:  ACUTE TO SUBACUTE LEFT PCA INFARCT IS CONFIRMED. NO ASSOCIATED BLOOD   PRODUCTS.    --- End of Report ---            VIN DIXON MD; Attending Radiologist  This document has been electronically signed. Jan 13 2022  8:47AM    < end of copied text >        Conclusions:  1. Normal left ventricular internal dimensions and wall  thicknesses.  2. Hyperdynamic left ventricular systolic function.  Endocardial visualization enhanced with intravenous  injection of Ultrasonic Enhancing Agent (Definity). No left  ventricular thrombus.  3. Normal right ventricular size and function.  4. Agitated saline injection and color flow Doppler  demonstrates no evidence of a patent foramen ovale.  *** No previous Echo exam.
SPENSER ESQUIVEL  76y Male  MRN:[id number]    Patient is a 76y old  Male who presents with a chief complaint of stroke (12 Jan 2022 09:18)    HPI:  76yr M hx of prior strokes, prostate CA on chemo, recent DVT diagnosis requiring thrombectomy , on xarelto p/w AMS. per wife, is being confused increasingly since last night. continued to mistake places at home and complaining of not being able to see the TV however was looking at other objects.   	wife denies fever chills, n/v, abd pain, urinary sx, cough, sorethroat, sick contacts.  	is covid vax and boosted  	at baseline, is alert and oriented and is functional and ambulatory  presented as stroke alert  noted to have uti   (11 Jan 2022 21:23)      Patient seen and evaluated at bedside. No acute events overnight except as noted.    Interval HPI: no acute events o/n    PAST MEDICAL & SURGICAL HISTORY:  CVA (cerebral vascular accident)    Deep vein thrombosis (DVT)    Prostate CA    Hepatobiliary cancer        REVIEW OF SYSTEMS:  as per hpi    VITALS:  Vital Signs Last 24 Hrs  T(C): 36.8 (14 Jan 2022 11:14), Max: 37 (14 Jan 2022 04:22)  T(F): 98.2 (14 Jan 2022 11:14), Max: 98.6 (14 Jan 2022 04:22)  HR: 71 (14 Jan 2022 11:14) (71 - 72)  BP: 106/68 (14 Jan 2022 11:14) (106/68 - 124/70)  BP(mean): --  RR: 18 (14 Jan 2022 11:14) (18 - 18)  SpO2: 94% (14 Jan 2022 11:14) (94% - 98%)    PHYSICAL EXAM:  GENERAL: NAD, well-developed  HEAD:  Atraumatic, Normocephalic  EYES: EOMI, PERRLA, conjunctiva and sclera clear  NECK: Supple, No JVD  CHEST/LUNG: Clear to auscultation bilaterally; No wheeze  HEART: S1, S2; No murmurs, rubs, or gallops  ABDOMEN: Soft, Nontender, Nondistended; Bowel sounds present  EXTREMITIES:  2+ Peripheral Pulses, No clubbing, cyanosis, or edema  PSYCH: Normal affect  NEUROLOGY: AAOX3; non-focal  SKIN: No rashes or lesions    Consultant(s) Notes Reviewed:  [x ] YES  [ ] NO  Care Discussed with Consultants/Other Providers [ x] YES  [ ] NO    MEDS:  MEDICATIONS  (STANDING):  cefTRIAXone   IVPB 1000 milliGRAM(s) IV Intermittent every 24 hours  enoxaparin Injectable 125 milliGRAM(s) SubCutaneous two times a day  sodium chloride 0.9%. 1000 milliLiter(s) (75 mL/Hr) IV Continuous <Continuous>    MEDICATIONS  (PRN):        ALLERGIES:  Allergy Status Unknown      LABS:                                                                  9.0    4.71  )-----------( 37       ( 14 Jan 2022 06:06 )             27.8   01-14    142  |  109<H>  |  16  ----------------------------<  92  3.6   |  24  |  0.77    Ca    8.2<L>      14 Jan 2022 06:06  < from: VA Duplex Lower Ext Vein Scan, Bilat (01.13.22 @ 13:35) >    IMPRESSION:  There is thrombosis of the bilateral femoral, popliteal veins and tibial   peroneal trunks. The findings are of unknown chronicity as there is wall   thickening involving the left common femoral and bilateral femoralveins   at the proximal thigh level.    The calf veins were not visualized.    < end of copied text >      < from: MR Head No Cont (01.12.22 @ 18:31) >    IMPRESSION:  ACUTE TO SUBACUTE LEFT PCA INFARCT IS CONFIRMED. NO ASSOCIATED BLOOD   PRODUCTS.    < end of copied text >      < from: CT Angio Neck w/ IV Cont (01.11.22 @ 09:15) >  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 Occlusion of the distal left posterior cerebral artery on   CTA.    < end of copied text >    < from: TTE with Doppler (w/Cont) (01.12.22 @ 13:46) >  --------------------------  Conclusions:  1. Normal left ventricular internal dimensions and wall  thicknesses.  2. Hyperdynamic left ventricular systolic function.  Endocardial visualization enhanced with intravenous  injection of Ultrasonic Enhancing Agent (Definity). No left  ventricular thrombus.  3. Normal right ventricular size and function.  4. Agitated saline injection and color flow Doppler  demonstrates no evidence of a patent foramen ovale.  *** No previous Echo exam.  -------------------------------------------------------    < end of copied text >      
Infectious Diseases progress note:    Subjective: Events noted.  Afebrile.  No leukocytosis    ROS:  CONSTITUTIONAL:  No fever, chills, rigors  CARDIOVASCULAR:  No chest pain or palpitations  RESPIRATORY:   No SOB, cough, dyspnea on exertion.  No wheezing  GASTROINTESTINAL:  No abd pain, N/V, diarrhea/constipation  EXTREMITIES:  No swelling or joint pain  GENITOURINARY:  No burning on urination, increased frequency or urgency.  No flank pain  NEUROLOGIC:  No HA, visual disturbances  SKIN: No rashes    Allergies    Allergy Status Unknown    Intolerances        ANTIBIOTICS/RELEVANT:  antimicrobials  cefTRIAXone   IVPB 1000 milliGRAM(s) IV Intermittent every 24 hours    immunologic:    OTHER:  rivaroxaban 20 milliGRAM(s) Oral with dinner  sodium chloride 0.9% Bolus 500 milliLiter(s) IV Bolus once  sodium chloride 0.9%. 1000 milliLiter(s) IV Continuous <Continuous>      Objective:  Vital Signs Last 24 Hrs  T(C): 36.9 (2022 09:45), Max: 36.9 (2022 15:13)  T(F): 98.5 (2022 09:45), Max: 98.5 (2022 09:45)  HR: 93 (2022 09:45) (70 - 93)  BP: 94/76 (2022 09:45) (94/76 - 132/72)  BP(mean): --  RR: 18 (2022 09:45) (18 - 19)  SpO2: 96% (2022 09:45) (94% - 98%)    PHYSICAL EXAM:  Constitutional:NAD  Eyes:JUSTO, EOMI  Ear/Nose/Throat: no thrush, mucositis.  Moist mucous membranes	  Neck:no JVD, no lymphadenopathy, supple  Respiratory: CTA lili  Cardiovascular: S1S2 RRR, no murmurs  Gastrointestinal:soft, nontender,  nondistended (+) BS  Extremities:no e/e/c  Skin:  no rashes, open wounds or ulcerations        LABS:                        9.8    8.79  )-----------( 42       ( 2022 10:08 )             31.0     -12    137  |  106  |  17  ----------------------------<  162<H>  4.2   |  23  |  0.82    Ca    8.5      2022 10:08    TPro  5.1<L>  /  Alb  2.7<L>  /  TBili  1.2  /  DBili  x   /  AST  23  /  ALT  14  /  AlkPhos  156<H>      PT/INR - ( 2022 10:20 )   PT: 18.7 sec;   INR: 1.59 ratio         PTT - ( 2022 10:20 )  PTT:29.3 sec  Urinalysis Basic - ( 2022 10:20 )    Color: Yellow / Appearance: Clear / S.046 / pH: x  Gluc: x / Ketone: Negative  / Bili: Negative / Urobili: Negative   Blood: x / Protein: Trace / Nitrite: Positive   Leuk Esterase: Small / RBC: 1 /hpf / WBC 14 /HPF   Sq Epi: x / Non Sq Epi: 2 /hpf / Bacteria: Many                          MICROBIOLOGY:          RADIOLOGY & ADDITIONAL STUDIES:    
SPENSER ESQUIVEL  76y Male  MRN:[id number]    Patient is a 76y old  Male who presents with a chief complaint of stroke (2022 09:18)    HPI:  76yr M hx of prior strokes, prostate CA on chemo, recent DVT diagnosis requiring thrombectomy , on xarelto p/w AMS. per wife, is being confused increasingly since last night. continued to mistake places at home and complaining of not being able to see the TV however was looking at other objects.   	wife denies fever chills, n/v, abd pain, urinary sx, cough, sorethroat, sick contacts.  	is covid vax and boosted  	at baseline, is alert and oriented and is functional and ambulatory  presented as stroke alert  noted to have uti   (2022 21:23)      Patient seen and evaluated at bedside. No acute events overnight except as noted.    Interval HPI: no acute events o/n    PAST MEDICAL & SURGICAL HISTORY:  CVA (cerebral vascular accident)    Deep vein thrombosis (DVT)    Prostate CA    Hepatobiliary cancer        REVIEW OF SYSTEMS:  as per hpi    VITALS:  Vital Signs Last 24 Hrs  T(C): 36.8 (2022 12:11), Max: 36.9 (2022 15:13)  T(F): 98.2 (2022 12:11), Max: 98.5 (2022 09:45)  HR: 73 (2022 12:11) (70 - 93)  BP: 110/64 (2022 12:11) (94/76 - 132/72)  BP(mean): --  RR: 18 (2022 12:11) (18 - 19)  SpO2: 99% (2022 12:11) (94% - 99%)  CAPILLARY BLOOD GLUCOSE        I&O's Summary      PHYSICAL EXAM:  GENERAL: NAD, well-developed  HEAD:  Atraumatic, Normocephalic  EYES: EOMI, PERRLA, conjunctiva and sclera clear  NECK: Supple, No JVD  CHEST/LUNG: Clear to auscultation bilaterally; No wheeze  HEART: S1, S2; No murmurs, rubs, or gallops  ABDOMEN: Soft, Nontender, Nondistended; Bowel sounds present  EXTREMITIES:  2+ Peripheral Pulses, No clubbing, cyanosis, or edema  PSYCH: Normal affect  NEUROLOGY: AAOX3; non-focal  SKIN: No rashes or lesions    Consultant(s) Notes Reviewed:  [x ] YES  [ ] NO  Care Discussed with Consultants/Other Providers [ x] YES  [ ] NO    MEDS:  MEDICATIONS  (STANDING):  cefTRIAXone   IVPB 1000 milliGRAM(s) IV Intermittent every 24 hours  rivaroxaban 20 milliGRAM(s) Oral with dinner  sodium chloride 0.9%. 1000 milliLiter(s) (75 mL/Hr) IV Continuous <Continuous>    MEDICATIONS  (PRN):    ALLERGIES:  Allergy Status Unknown      LABS:                        9.8    8.79  )-----------( 42       ( 2022 10:08 )             31.0         137  |  106  |  17  ----------------------------<  162<H>  4.2   |  23  |  0.82    Ca    8.5      2022 10:08    TPro  5.1<L>  /  Alb  2.7<L>  /  TBili  1.2  /  DBili  x   /  AST  23  /  ALT  14  /  AlkPhos  156<H>      PT/INR - ( 2022 10:20 )   PT: 18.7 sec;   INR: 1.59 ratio         PTT - ( 2022 10:20 )  PTT:29.3 sec      LIVER FUNCTIONS - ( 2022 09:06 )  Alb: 2.7 g/dL / Pro: 5.1 g/dL / ALK PHOS: 156 U/L / ALT: 14 U/L / AST: 23 U/L / GGT: x           Urinalysis Basic - ( 2022 10:20 )    Color: Yellow / Appearance: Clear / S.046 / pH: x  Gluc: x / Ketone: Negative  / Bili: Negative / Urobili: Negative   Blood: x / Protein: Trace / Nitrite: Positive   Leuk Esterase: Small / RBC: 1 /hpf / WBC 14 /HPF   Sq Epi: x / Non Sq Epi: 2 /hpf / Bacteria: Many      < from: CT Angio Neck w/ IV Cont (22 @ 09:15) >  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 Occlusion of the distal left posterior cerebral artery on   CTA.    < end of copied text >  
SPENSER ESQUIVEL  76y Male  MRN:[id number]    Patient is a 76y old  Male who presents with a chief complaint of stroke (12 Jan 2022 09:18)    HPI:  76yr M hx of prior strokes, prostate CA on chemo, recent DVT diagnosis requiring thrombectomy , on xarelto p/w AMS. per wife, is being confused increasingly since last night. continued to mistake places at home and complaining of not being able to see the TV however was looking at other objects.   	wife denies fever chills, n/v, abd pain, urinary sx, cough, sorethroat, sick contacts.  	is covid vax and boosted  	at baseline, is alert and oriented and is functional and ambulatory  presented as stroke alert  noted to have uti   (11 Jan 2022 21:23)      Patient seen and evaluated at bedside. No acute events overnight except as noted.    Interval HPI: no acute events o/n    PAST MEDICAL & SURGICAL HISTORY:  CVA (cerebral vascular accident)    Deep vein thrombosis (DVT)    Prostate CA    Hepatobiliary cancer        REVIEW OF SYSTEMS:  as per hpi    VITALS:   Vital Signs Last 24 Hrs  T(C): 36.6 (13 Jan 2022 05:42), Max: 36.9 (12 Jan 2022 16:37)  T(F): 97.9 (13 Jan 2022 05:42), Max: 98.4 (12 Jan 2022 16:37)  HR: 60 (13 Jan 2022 05:42) (60 - 75)  BP: 110/50 (13 Jan 2022 05:42) (110/50 - 139/75)  BP(mean): --  RR: 18 (13 Jan 2022 05:42) (18 - 18)  SpO2: 96% (13 Jan 2022 05:42) (96% - 99%)      PHYSICAL EXAM:  GENERAL: NAD, well-developed  HEAD:  Atraumatic, Normocephalic  EYES: EOMI, PERRLA, conjunctiva and sclera clear  NECK: Supple, No JVD  CHEST/LUNG: Clear to auscultation bilaterally; No wheeze  HEART: S1, S2; No murmurs, rubs, or gallops  ABDOMEN: Soft, Nontender, Nondistended; Bowel sounds present  EXTREMITIES:  2+ Peripheral Pulses, No clubbing, cyanosis, or edema  PSYCH: Normal affect  NEUROLOGY: AAOX3; non-focal  SKIN: No rashes or lesions    Consultant(s) Notes Reviewed:  [x ] YES  [ ] NO  Care Discussed with Consultants/Other Providers [ x] YES  [ ] NO    MEDS:   MEDICATIONS  (STANDING):  cefTRIAXone   IVPB 1000 milliGRAM(s) IV Intermittent every 24 hours  rivaroxaban 20 milliGRAM(s) Oral with dinner  sodium chloride 0.9%. 1000 milliLiter(s) (75 mL/Hr) IV Continuous <Continuous>    MEDICATIONS  (PRN):      ALLERGIES:  Allergy Status Unknown      LABS:                                                9.8    8.79  )-----------( 42       ( 12 Jan 2022 10:08 )             31.0   01-12    137  |  106  |  17  ----------------------------<  162<H>  4.2   |  23  |  0.82    Ca    8.5      12 Jan 2022 10:08    < from: MR Head No Cont (01.12.22 @ 18:31) >    IMPRESSION:  ACUTE TO SUBACUTE LEFT PCA INFARCT IS CONFIRMED. NO ASSOCIATED BLOOD   PRODUCTS.    < end of copied text >      < from: CT Angio Neck w/ IV Cont (01.11.22 @ 09:15) >  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 Occlusion of the distal left posterior cerebral artery on   CTA.    < end of copied text >    < from: TTE with Doppler (w/Cont) (01.12.22 @ 13:46) >  --------------------------  Conclusions:  1. Normal left ventricular internal dimensions and wall  thicknesses.  2. Hyperdynamic left ventricular systolic function.  Endocardial visualization enhanced with intravenous  injection of Ultrasonic Enhancing Agent (Definity). No left  ventricular thrombus.  3. Normal right ventricular size and function.  4. Agitated saline injection and color flow Doppler  demonstrates no evidence of a patent foramen ovale.  *** No previous Echo exam.  -------------------------------------------------------    < end of copied text >      
SPENSER ESQUIVEL  76y Male  MRN:[id number]    Patient is a 76y old  Male who presents with a chief complaint of stroke (12 Jan 2022 09:18)    HPI:  76yr M hx of prior strokes, prostate CA on chemo, recent DVT diagnosis requiring thrombectomy , on xarelto p/w AMS. per wife, is being confused increasingly since last night. continued to mistake places at home and complaining of not being able to see the TV however was looking at other objects.   	wife denies fever chills, n/v, abd pain, urinary sx, cough, sorethroat, sick contacts.  	is covid vax and boosted  	at baseline, is alert and oriented and is functional and ambulatory  presented as stroke alert  noted to have uti   (11 Jan 2022 21:23)      Patient seen and evaluated at bedside. No acute events overnight except as noted.    Interval HPI: no acute events o/n    PAST MEDICAL & SURGICAL HISTORY:  CVA (cerebral vascular accident)    Deep vein thrombosis (DVT)    Prostate CA    Hepatobiliary cancer        REVIEW OF SYSTEMS:  as per hpi    VITALS:  Vital Signs Last 24 Hrs  T(C): 37 (15 Drake 2022 11:14), Max: 37.2 (14 Jan 2022 20:22)  T(F): 98.6 (15 Drake 2022 11:14), Max: 98.9 (14 Jan 2022 20:22)  HR: 74 (15 Drake 2022 11:14) (59 - 74)  BP: 99/63 (15 Drake 2022 11:14) (99/63 - 114/62)  BP(mean): --  RR: 18 (15 Drake 2022 11:14) (18 - 18)  SpO2: 95% (15 Drake 2022 11:14) (95% - 95%)    PHYSICAL EXAM:  GENERAL: NAD, well-developed  HEAD:  Atraumatic, Normocephalic  EYES: EOMI, PERRLA, conjunctiva and sclera clear  NECK: Supple, No JVD  CHEST/LUNG: Clear to auscultation bilaterally; No wheeze  HEART: S1, S2; No murmurs, rubs, or gallops  ABDOMEN: Soft, Nontender, Nondistended; Bowel sounds present  EXTREMITIES:  2+ Peripheral Pulses, No clubbing, cyanosis, or edema  PSYCH: Normal affect  NEUROLOGY: AAOX3; non-focal  SKIN: No rashes or lesions    Consultant(s) Notes Reviewed:  [x ] YES  [ ] NO  Care Discussed with Consultants/Other Providers [ x] YES  [ ] NO    MEDS:   MEDICATIONS  (STANDING):  cefuroxime   Tablet 250 milliGRAM(s) Oral every 12 hours  enoxaparin Injectable 120 milliGRAM(s) SubCutaneous two times a day  sodium chloride 0.9%. 1000 milliLiter(s) (75 mL/Hr) IV Continuous <Continuous>    MEDICATIONS  (PRN):        ALLERGIES:  Allergy Status Unknown      LABS:                                               01-15    141  |  108  |  17  ----------------------------<  94  3.6   |  23  |  0.78    Ca    8.4      15 Drake 2022 07:17                                             9.4    4.80  )-----------( 40       ( 15 Drake 2022 07:17 )             29.5      < from: VA Duplex Lower Ext Vein Scan, Bilat (01.13.22 @ 13:35) >    IMPRESSION:  There is thrombosis of the bilateral femoral, popliteal veins and tibial   peroneal trunks. The findings are of unknown chronicity as there is wall   thickening involving the left common femoral and bilateral femoralveins   at the proximal thigh level.    The calf veins were not visualized.    < end of copied text >      < from: MR Head No Cont (01.12.22 @ 18:31) >    IMPRESSION:  ACUTE TO SUBACUTE LEFT PCA INFARCT IS CONFIRMED. NO ASSOCIATED BLOOD   PRODUCTS.    < end of copied text >      < from: CT Angio Neck w/ IV Cont (01.11.22 @ 09:15) >  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 Occlusion of the distal left posterior cerebral artery on   CTA.    < end of copied text >    < from: TTE with Doppler (w/Cont) (01.12.22 @ 13:46) >  --------------------------  Conclusions:  1. Normal left ventricular internal dimensions and wall  thicknesses.  2. Hyperdynamic left ventricular systolic function.  Endocardial visualization enhanced with intravenous  injection of Ultrasonic Enhancing Agent (Definity). No left  ventricular thrombus.  3. Normal right ventricular size and function.  4. Agitated saline injection and color flow Doppler  demonstrates no evidence of a patent foramen ovale.  *** No previous Echo exam.  -------------------------------------------------------    < end of copied text >      
Subjective: Patient seen and examined. No new events except as noted.   wants to go home    REVIEW OF SYSTEMS:    CONSTITUTIONAL: + weakness, fevers or chills  EYES/ENT: No visual changes;  No vertigo or throat pain   NECK: No pain or stiffness  RESPIRATORY: No cough, wheezing, hemoptysis; No shortness of breath  CARDIOVASCULAR: No chest pain or palpitations  GASTROINTESTINAL: No abdominal or epigastric pain. No nausea, vomiting, or hematemesis; No diarrhea or constipation. No melena or hematochezia.  GENITOURINARY: No dysuria, frequency or hematuria  NEUROLOGICAL: No numbness or weakness  SKIN: No itching, burning, rashes, or lesions   All other review of systems is negative unless indicated above.    MEDICATIONS:  MEDICATIONS  (STANDING):  cefuroxime   Tablet 250 milliGRAM(s) Oral every 12 hours  enoxaparin Injectable 120 milliGRAM(s) SubCutaneous two times a day  sodium chloride 0.9%. 1000 milliLiter(s) (75 mL/Hr) IV Continuous <Continuous>      PHYSICAL EXAM:  T(C): 37.1 (01-15-22 @ 04:30), Max: 37.2 (01-14-22 @ 20:22)  HR: 68 (01-15-22 @ 04:30) (59 - 71)  BP: 102/60 (01-15-22 @ 04:30) (102/60 - 110/63)  RR: 18 (01-15-22 @ 04:30) (18 - 18)  SpO2: 95% (01-15-22 @ 04:30) (94% - 95%)  Wt(kg): --  I&O's Summary    14 Jan 2022 07:01  -  15 Drake 2022 07:00  --------------------------------------------------------  IN: 600 mL / OUT: 400 mL / NET: 200 mL    15 Drake 2022 07:01  -  15 Drake 2022 10:54  --------------------------------------------------------  IN: 240 mL / OUT: 400 mL / NET: -160 mL      Height (cm): 185.4 (01-14 @ 12:02)  Weight (kg): 125.1 (01-14 @ 12:02)  BMI (kg/m2): 36.4 (01-14 @ 12:02)  BSA (m2): 2.47 (01-14 @ 12:02)    Appearance: Normal, NAD	  HEENT:   Normal oral mucosa, PERRL, EOMI	  Lymphatic: No lymphadenopathy  Cardiovascular: Normal S1 S2, No JVD, No murmurs, No edema  Respiratory: Lungs clear to auscultation	  Psychiatry: A & O x 3 with some forgetfulness, Mood & affect appropriate  Gastrointestinal:  Soft, Non-tender, + BS	  Skin: No rashes, No ecchymoses, No cyanosis	  Neurologic: Non-focal  Extremities: + edema BLE, Normal range of motion, No clubbing, cyanosis  Vascular: Peripheral pulses palpable 2+ bilaterally      LABS:    CARDIAC MARKERS:                                9.4    4.80  )-----------( 40       ( 15 Drake 2022 07:17 )             29.5     01-15    141  |  108  |  17  ----------------------------<  94  3.6   |  23  |  0.78    Ca    8.4      15 Drake 2022 07:17      proBNP:   Lipid Profile:   HgA1c:   TSH:             TELEMETRY: 	    ECG:  	  RADIOLOGY:   DIAGNOSTIC TESTING:  [ ] Echocardiogram:  [ ]  Catheterization:  [ ] Stress Test:    OTHER: 	          
Subjective: Patient seen and examined. No new events except as noted.     REVIEW OF SYSTEMS:    CONSTITUTIONAL: + weakness, fevers or chills  EYES/ENT: No visual changes;  No vertigo or throat pain   NECK: No pain or stiffness  RESPIRATORY: No cough, wheezing, hemoptysis; No shortness of breath  CARDIOVASCULAR: No chest pain or palpitations  GASTROINTESTINAL: No abdominal or epigastric pain. No nausea, vomiting, or hematemesis; No diarrhea or constipation. No melena or hematochezia.  GENITOURINARY: No dysuria, frequency or hematuria  NEUROLOGICAL: No numbness or weakness  SKIN: No itching, burning, rashes, or lesions   All other review of systems is negative unless indicated above.    MEDICATIONS:  MEDICATIONS  (STANDING):  cefTRIAXone   IVPB 1000 milliGRAM(s) IV Intermittent every 24 hours  rivaroxaban 20 milliGRAM(s) Oral with dinner  sodium chloride 0.9%. 1000 milliLiter(s) (75 mL/Hr) IV Continuous <Continuous>      PHYSICAL EXAM:  T(C): 36.4 (01-13-22 @ 12:17), Max: 36.9 (01-12-22 @ 16:37)  HR: 76 (01-13-22 @ 12:17) (60 - 76)  BP: 110/64 (01-13-22 @ 12:17) (110/50 - 139/75)  RR: 18 (01-13-22 @ 12:17) (18 - 18)  SpO2: 97% (01-13-22 @ 12:17) (96% - 99%)  Wt(kg): --  I&O's Summary    12 Jan 2022 07:01  -  13 Jan 2022 07:00  --------------------------------------------------------  IN: 240 mL / OUT: 1375 mL / NET: -1135 mL    13 Jan 2022 07:01  -  13 Jan 2022 14:19  --------------------------------------------------------  IN: 300 mL / OUT: 300 mL / NET: 0 mL          Appearance: Normal, NAD	  HEENT:   Normal oral mucosa, PERRL, EOMI	  Lymphatic: No lymphadenopathy  Cardiovascular: Normal S1 S2, No JVD, No murmurs, No edema  Respiratory: Lungs clear to auscultation	  Psychiatry: A & O x 3 with some forgetfulness, Mood & affect appropriate  Gastrointestinal:  Soft, Non-tender, + BS	  Skin: No rashes, No ecchymoses, No cyanosis	  Neurologic: Non-focal  Extremities: + edema BLE, Normal range of motion, No clubbing, cyanosis  Vascular: Peripheral pulses palpable 2+ bilaterally    LABS:    CARDIAC MARKERS:                                9.8    8.79  )-----------( 42       ( 12 Jan 2022 10:08 )             31.0     01-12    137  |  106  |  17  ----------------------------<  162<H>  4.2   |  23  |  0.82    Ca    8.5      12 Jan 2022 10:08      proBNP:   Lipid Profile:   HgA1c:   TSH:     1          TELEMETRY: 	    ECG:  	  RADIOLOGY:   DIAGNOSTIC TESTING:  [ ] Echocardiogram:  [ ]  Catheterization:  [ ] Stress Test:    OTHER: 	          
Subjective: Patient seen and examined. No new events except as noted.     REVIEW OF SYSTEMS:    CONSTITUTIONAL: +weakness, fevers or chills  EYES/ENT: No visual changes;  No vertigo or throat pain   NECK: No pain or stiffness  RESPIRATORY: No cough, wheezing, hemoptysis; No shortness of breath  CARDIOVASCULAR: No chest pain or palpitations  GASTROINTESTINAL: No abdominal or epigastric pain. No nausea, vomiting, or hematemesis; No diarrhea or constipation. No melena or hematochezia.  GENITOURINARY: No dysuria, frequency or hematuria  NEUROLOGICAL: No numbness or weakness  SKIN: No itching, burning, rashes, or lesions   All other review of systems is negative unless indicated above.    MEDICATIONS:  MEDICATIONS  (STANDING):  cefTRIAXone   IVPB 1000 milliGRAM(s) IV Intermittent every 24 hours  rivaroxaban 20 milliGRAM(s) Oral with dinner  sodium chloride 0.9%. 1000 milliLiter(s) (75 mL/Hr) IV Continuous <Continuous>      PHYSICAL EXAM:  T(C): 36.8 (01-14-22 @ 11:14), Max: 37 (01-14-22 @ 04:22)  HR: 71 (01-14-22 @ 11:14) (71 - 76)  BP: 106/68 (01-14-22 @ 11:14) (106/68 - 124/70)  RR: 18 (01-14-22 @ 11:14) (18 - 18)  SpO2: 94% (01-14-22 @ 11:14) (94% - 98%)  Wt(kg): --  I&O's Summary    13 Jan 2022 07:01  -  14 Jan 2022 07:00  --------------------------------------------------------  IN: 300 mL / OUT: 650 mL / NET: -350 mL          Appearance: Normal, NAD	  HEENT:   Normal oral mucosa, PERRL, EOMI	  Lymphatic: No lymphadenopathy  Cardiovascular: Normal S1 S2, No JVD, No murmurs, No edema  Respiratory: Lungs clear to auscultation	  Psychiatry: A & O x 3 with some forgetfulness, Mood & affect appropriate  Gastrointestinal:  Soft, Non-tender, + BS	  Skin: No rashes, No ecchymoses, No cyanosis	  Neurologic: Non-focal  Extremities: + edema BLE, Normal range of motion, No clubbing, cyanosis  Vascular: Peripheral pulses palpable 2+ bilaterally          LABS:    CARDIAC MARKERS:                                9.0    4.71  )-----------( 37       ( 14 Jan 2022 06:06 )             27.8     01-14    142  |  109<H>  |  16  ----------------------------<  92  3.6   |  24  |  0.77    Ca    8.2<L>      14 Jan 2022 06:06      proBNP:   Lipid Profile:   HgA1c:   TSH:             TELEMETRY: 	  SR  ECG:  	  RADIOLOGY: < from: MR Head No Cont (01.12.22 @ 18:31) >  ACC: [id number] EXAM:  MR BRAIN                          PROCEDURE DATE:  01/12/2022          INTERPRETATION:  MR BRAIN    Clinical information: 76 yrs old male r/o cva    r/o cva     A noncontrast MRI of the brain was performed.    TECHNIQUE:  In the sagittal plane a T1 weighted sequence was performed.  In the axial   plane T1, T2, FLAIR, GRE, and diffusion weighted imaging was obtained.    COMPARISON:  Exam is compared to head CT of January 11, 2022.    FINDINGS:  BRAIN PARENCHYMA:  There is mild to moderate atrophy. An acute to subacute left PCA infarct   is confirmed. There is involvement of the left thalamus as well as the   medial aspect of the left temporal and left occipital lobes. No   associated blood products. Regional mass effect.    Chronic small right occipital and very small right parietal infarcts are   present. Chronic lacunar infarcts involve the bilateral basal ganglia.   There are mild to moderate chronic periventricular and subcortical white   matter ischemic changes..    VENTRICLES:  There is no hydrocephalus.    EXTRA-AXIAL:  No extra-axial mass.  No evidence of a subdural or epidural collection.    Patent basal cisterns.    OTHER:  Mild mucosal thickening is present within the ethmoid sinuses. Dental   artifactis present.    IMPRESSION:  ACUTE TO SUBACUTE LEFT PCA INFARCT IS CONFIRMED. NO ASSOCIATED BLOOD   PRODUCTS.    --- End of Report ---        < from: VA Duplex Lower Ext Vein Scan, Bilat (01.13.22 @ 13:35) >    PROCEDURE DATE:  01/13/2022          INTERPRETATION:  Clinical information: 76-year-old with leg swelling.   Assess for DVT    COMPARISON: None available.    TECHNIQUE: Duplex sonography of the BILATERAL LOWER extremity veins with   color and spectral Doppler, with and without compression.    FINDINGS:    RIGHT:  The right common femoral vein is patent and compressible. Wall thickening   is identified in the right femoral vein at the proximal thigh level.   There is thrombosis of the right femoral vein at the mid and distal thigh   levels as well as the popliteal vein and tibial peroneal trunk. The right   calf veins were not visualized.    LEFT:  The left common femoral vein and femoral vein at the proximal thigh level   demonstrate wall thickening. There is thrombosis of the left femoral vein   at the mid and distal thigh levels as well as the popliteal vein and   tibial peroneal trunk. The left calf veins were not visualized.    IMPRESSION:  There is thrombosis of the bilateral femoral, popliteal veins and tibial   peroneal trunks. The findings are of unknown chronicity as there is wall   thickening involving the left common femoral and bilateral femoralveins   at the proximal thigh level.    The calf veins were not visualized.    CRISTIAN De Anda was informed of the findings following the study on 1/13/2022.      --- End of Report ---      < end of copied text >      DIAGNOSTIC TESTING:  [ ] Echocardiogram:  < from: TTE with Doppler (w/Cont) (01.12.22 @ 13:46) >    Patient name: SPENSER ESQUIVEL  YOB: 1945   Age: 76 (M)   MR#: [id number]  Study Date: 1/12/2022  [facility]  Study quality: Technically difficult  Referring Physician: Conor Ndiaye MD  Blood Pressure: 94/76 mmHg  Height: 185 cm  Weight: 132 kg  BSA: 2.5 m2  ------------------------------------------------------------------------  PROCEDURE: Transthoracic echocardiogram with 2-D, M-Mode  and complete spectral and color flow Doppler. Verbal  consent was obtained for injection of  Ultrasonic Enhancing  Agent following a discussion of risks and benefits.  Following intravenous injection of Ultrasonic Enhancing  Agent , harmonic imaging was performed.  INDICATION: Cerebral infarction,unspecified (I63.9)  ------------------------------------------------------------------------  Dimensions:    Normal Values:  LA:     3.8    2.0 - 4.0 cm  Ao:     4.1    2.0 - 3.8 cm  SEPTUM: 0.9    0.6 - 1.2 cm  PWT:    0.9    0.6 - 1.1 cm  LVIDd:  4.7    3.0 - 5.6 cm  LVIDs:  3.0    1.8 - 4.0 cm  Derived variables:  LVMI: 57 g/m2  RWT: 0.38  Fractional short: 36 %  EF (Visual Estimate): 75-80 %  Doppler Peak Velocity (m/sec): AoV=1.5  ------------------------------------------------------------------------  Observations:  Mitral Valve: Mitral annular calcification, otherwise  normal mitral valve.  Aortic Valve/Aorta: Normal trileaflet aortic valve. Peak  transaortic valve gradient equals 9 mm Hg, mean transaortic  valve gradient equals 4 mmHg, aortic valve velocity time  integral equals 99 cm. Minimal aortic regurgitation.  Peak  left ventricular outflow tract gradient equals 7 mm Hg,  mean gradient is equal to 3 mm Hg, LVOT velocity time  integral equals 25 cm.  Aortic Root: 4.1 cm.  Left Atrium: LA volume index = 15 cc/m2.  Left Ventricle: Hyperdynamic left ventricular systolic  function.  Endocardial visualization enhanced with  intravenous injection of Ultrasonic Enhancing Agent  (Definity). No left ventricular thrombus. Normalleft  ventricular internal dimensions and wall thicknesses. Mild  diastolic dysfunction (Stage I).  Right Heart: Normal right atrium. Normal right ventricular  size and function. Normal tricuspid valve. Normal pulmonic  valve.  Pericardium/Pleura: Normal pericardium with no pericardial  effusion.  Hemodynamic: Estimated right atrial pressure is 8 mm Hg.  Agitated saline injection and color flow Doppler  demonstrates no evidence of a patent foramen ovale.  ------------------------------------------------------------------------  Conclusions:  1. Normal left ventricular internal dimensions and wall  thicknesses.  2. Hyperdynamic left ventricular systolic function.  Endocardial visualization enhanced with intravenous  injection of Ultrasonic Enhancing Agent (Definity). No left  ventricular thrombus.  3. Normal right ventricular size and function.  4. Agitated saline injection and color flow Doppler  demonstrates no evidence of a patent foramen ovale.  *** No previous Echo exam.  ------------------------------------------------------------------------  Confirmed on  1/12/2022 - 16:07:07 by CURTIS Sibley  ------------------------------------------------------------------------    < end of copied text >    [ ]  Catheterization:  [ ] Stress Test:    OTHER:

## 2022-01-15 NOTE — DISCHARGE NOTE NURSING/CASE MANAGEMENT/SOCIAL WORK - PATIENT PORTAL LINK FT
You can access the FollowMyHealth Patient Portal offered by [facility] by registering at the following website: [email or web address]. By joining [facility]’s FollowMyHealth portal, you will also be able to view your health information using other applications (apps) compatible with our system.

## 2022-01-15 NOTE — PROGRESS NOTE ADULT - PROBLEM SELECTOR PLAN 1
Pt is not a TPA candidate as he's outside window.   Not a thrombectomy candidate as no LVO seen on imaging.   NIHSS 4 (no age, could not name objects- mild aphasia, R complete hemianopia) on arrival.   preMRS 0  Neuro follow up   PT/OT
follow neuro recs.
Pt is not a TPA candidate as he's outside window.   Not a thrombectomy candidate as no LVO seen on imaging.   NIHSS 4 (no age, could not name objects- mild aphasia, R complete hemianopia) on arrival.   preMRS 0  Neuro follow up   PT/OT

## 2022-01-15 NOTE — PHYSICAL THERAPY INITIAL EVALUATION ADULT - GAIT DEVIATIONS NOTED, PT EVAL
pt with mild unsteadiness/lateral deviation at times without a device. PT reviewed visual scanning 2/2 visual deficits/decreased soheila

## 2022-01-15 NOTE — PHYSICAL THERAPY INITIAL EVALUATION ADULT - DISCHARGE DISPOSITION, PT EVAL
Home with home PT for further therex, balance, and home safety assessment. RW for ambulation. assist/supervision prn from family.

## 2022-01-15 NOTE — PROGRESS NOTE ADULT - ASSESSMENT
77 yo male h/o cva, prostate ca, hepatobiliary ca, dvt on xarelto, here with AMS  r/o cva  found to have uti    AMS  pt with subacute infarct on CT  neuro f/u noted  MRI noted  echo noted  neuro f/u  on xarelto -- will change to lovenox given malignancy and new dvt    uti  ceftriax  f/u cult and sens  can change to ceftin on dc - 7 days total     prostate ca, hepatobiliary ca  onc eval noted  outpt chemo    dvt  on xarelto - dvt has recurred  vasc eval noted  change to lovenox bid     cont other home meds     d/w wife on the phone     PT     dc home with outpt f/u    Advanced care planning was discussed with patient and family.  Advanced care planning forms were reviewed and discussed as appropriate.  Differential diagnosis and plan of care discussed with patient after the evaluation.   Pain assessed and judicious use of narcotics when appropriate was discussed.  Importance of Fall prevention discussed.  Counseling on Smoking and Alcohol cessation was offered when appropriate.  Counseling on Diet, exercise, and medication compliance was done.       Approx 60 minutes spent.

## 2022-01-15 NOTE — PHYSICAL THERAPY INITIAL EVALUATION ADULT - PRECAUTIONS/LIMITATIONS, REHAB EVAL
HISTORY AND RESULTS CONTINUED: DUPLEX BLE 1/13/22: There is thrombosis of the bilateral femoral, popliteal veins and tibial peroneal trunks. The findings are of unknown chronicity as there is wall thickening involving the left common femoral and bilateral femoral veins at the proximal thigh level. The calf veins were not visualized. MRI HEAD: ACUTE TO SUBACUTE LEFT PCA INFARCT IS CONFIRMED. NO ASSOCIATED BLOOD PRODUCTS. CT BRAIN/CTA HEAD/NECK: Old right occipital infarct. Subacute left posterior cerebral artery infarct. Occlusion of the distal left posterior cerebral artery on CTA. CHEST XRAY: clear/fall precautions

## 2022-01-15 NOTE — PROGRESS NOTE ADULT - NSPROGADDITIONALINFOA_GEN_ALL_CORE
- Counseling on diet, exercise, and medication adherence was done  - Counseling on smoking cessation and alcohol consumption offered when appropriate.  - Pain assessed and judicious use of narcotics when appropriate was discussed.    - Stroke education given when appropriate.  - Importance of fall prevention discussed.   - Differential diagnosis and plan of care discussed with patient and/or family and primary team

## 2022-01-15 NOTE — DISCHARGE NOTE PROVIDER - NSDCCPCAREPLAN_GEN_ALL_CORE_FT
PRINCIPAL DISCHARGE DIAGNOSIS  Diagnosis: AMS (altered mental status)  Assessment and Plan of Treatment:       SECONDARY DISCHARGE DIAGNOSES  Diagnosis: Acute UTI  Assessment and Plan of Treatment:

## 2022-01-15 NOTE — PROGRESS NOTE ADULT - ASSESSMENT
76 year old obese  male  with  HTN, prostate cancer diagnosed 6/2019 s/p prostectemy, metastaitc hepatic carcinoma dx Nov 2019 on chemotherapy, recent DVT diagnosis requiring thrombectomy on xarelto, presented to the ED from home as a code stroke for AMS.   was confused from night prior   Pt is not a TPA candidate as he's outside window.   Not a thrombectomy candidate as no LVO seen on imaging.    BP on scene 148/84 glucose 164  NIHSS 4 (no age, could not name objects- mild aphasia, R complete hemianopia) on arrival.   preMRS 0  o/e 1/12 AAOx2-3, RHHA (chronic)     CTH with chronic appearing right occipital infarct; but also newer Subacute left posterior cerebral artery infarct.   CTA with Occlusion of the distal left posterior cerebral artery    UA positive.   + thrombocytopenia to 40s  elevated alk phos   MRI brain with subacute L PCA infarct noted   LE with multiple DVTs   TTE with no evidence of PFO  LE doppler +_ DVTs  1/14, patient comfertable     Impression: AMS may 2/2 possible toxic/metabolic/infectious etiology (UTI). subacute stroke on imaging seems to be embolic, ESUS, may be hypercoag from malignancy vs cardiac source  - full dose lovenox started. need to monitor platelets outpatient   - lipitor 40 for secondary stroke prevention if no contraindication. does have elevated alk phos, held given liver Ca   - send APLS labs, beta 2 glycoprotein, lupus anticoagulant, cardiolipin Abs.  may be altered now that he is on DOAC  - Hemoglobin A1c and lipid panel    - ceftriaxone for UTI, Ucx with E.coli   - no objection to optho. non urgent.  B/L occipital infarcts can cause visual loss   - check blood cx. r/o bacteremia   - telemetry  - PT/OT/SS/SLP, OOBC  - BP normotensive. stroke is subacute   - onc eval noted.   - check FS, glucose control <180  - GI/DVT ppx  - Thank you for allowing me to participate in the care of this patient. Call with questions.   - attempted to call wife Trisha at [phone] but no answer. office number left will call back today.   - spoke with wife trisha at her home number [phone] on 1/14. all questions answered  - d/c planning   -Yosi Blancas MD  Vascular Neurology  Office: [phone] .

## 2022-01-15 NOTE — DISCHARGE NOTE PROVIDER - CARE PROVIDER_API CALL
Yosi Blancas)  Neurology; Vascular Neurology  [address] [facility] [facility] [address]  [place] [address]  Phone: [phone]  Fax: [phone]  Follow Up Time: 2 weeks

## 2022-01-15 NOTE — DISCHARGE NOTE NURSING/CASE MANAGEMENT/SOCIAL WORK - NSDCPEFALRISK_GEN_ALL_CORE
For information on Fall & Injury Prevention, visit: [email or web address] OR  [email or web address] OR  https://www.cdc.gov/steadi/patient.html

## 2022-01-15 NOTE — PHYSICAL THERAPY INITIAL EVALUATION ADULT - PLANNED THERAPY INTERVENTIONS, PT EVAL
GOAL: Pt will negotiate 2 steps with unilateral handrail in a step-to pattern independently within 2 weeks/balance training/gait training/strengthening/transfer training

## 2022-01-15 NOTE — DISCHARGE NOTE PROVIDER - HOSPITAL COURSE
This is a 76 year old obese  male  with  HTN, prostate cancer diagnosed 6/2019 s/p prostatectomy, metastatic hepatic carcinoma dx Nov 2019 on chemotherapy, recent DVT diagnosis requiring thrombectomy on Xarelto, presented to the ED from home as a code stroke for AMS.  Wife reported that he was confused from night prior. Patient was not a TPA candidate as he's outside window. Not a thrombectomy candidate as no LVO seen on imaging.   BP on scene 148/84 glucose 164, NIHSS 4 (no age, could not name objects- mild aphasia, R complete hemianopia) on arrival. This is a 76 year old obese  male  with  HTN, prostate cancer diagnosed 6/2019 s/p prostatectomy, metastatic hepatic carcinoma dx Nov 2019 on chemotherapy, recent DVT diagnosis requiring thrombectomy on Xarelto, presented to the ED from home as a code stroke for AMS.  Wife reported that he was confused from night prior. Patient was not a TPA candidate as he's outside window. Not a thrombectomy candidate as no LVO seen on imaging.   BP on scene 148/84 glucose 164, NIHSS 4 (no age, could not name objects- mild aphasia, R complete hemianopia) on arrival. Doppler with multiple DVT bilateral lower extremities. Urinary tract infection treated with ceftriaxone and changed to oral at discharge to finish 7 day course.

## 2022-01-15 NOTE — DISCHARGE NOTE PROVIDER - NSRESEARCHGRANT_CONTRAREASONSOTHERFT_GEN_A_CORE
Patient has a history of recent DVT and was treated with Xarelto , repeat dopplers with bilateral lower extremity DVT now on Lovenox

## 2022-01-15 NOTE — PROGRESS NOTE ADULT - TIME BILLING
Advanced care planning was discussed with patient and family.  Advanced care planning forms were reviewed and discussed as appropriate.  Differential diagnosis and plan of care discussed with patient after the evaluation.   Pain assessed and judicious use of narcotics when appropriate was discussed.  Importance of Fall prevention discussed.  Counseling on Smoking and Alcohol cessation was offered when appropriate.  Counseling on Diet, exercise, and medication compliance was done.

## 2022-01-15 NOTE — PROGRESS NOTE ADULT - PROVIDER SPECIALTY LIST ADULT
Cardiology
Infectious Disease
Internal Medicine
Neurology
Heme/Onc
Infectious Disease
Internal Medicine
Internal Medicine
Infectious Disease
Internal Medicine
Neurology
Neurology
Cardiology
Cardiology

## 2022-01-15 NOTE — PHYSICAL THERAPY INITIAL EVALUATION ADULT - PERTINENT HX OF CURRENT PROBLEM, REHAB EVAL
75 y/o M with h/o prior strokes, prostate CA on chemo, recent DVT diagnosis requiring thrombectomy, on xarelto p/w AMS. per wife, pt been increasingly confused, continued to mistake places at home and complaining of not being able to see the TV however was looking at other objects. 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

## 2022-01-15 NOTE — PHYSICAL THERAPY INITIAL EVALUATION ADULT - ADDITIONAL COMMENTS
history somewhat limited historian but reports living with spouse and daughter in a private home with a couple of steps to enter, first floor set up inside (ranch style house). Pt states prior to admission being independent with all functional mobility and ADLs. pt uncertain regarding DME. attempted to reach pt's spouse Margo ([phone]) but no response.

## 2022-01-15 NOTE — DISCHARGE NOTE PROVIDER - NSDCMRMEDTOKEN_GEN_ALL_CORE_FT
stool softener: 2 cap(s) orally every other day (at bedtime), As Needed  Xarelto 20 mg oral tablet: 1 tab(s) orally once a day (in the evening)   cefuroxime 250 mg oral tablet: 1 tab(s) orally every 12 hours  Lovenox 120 mg/0.8 mL injectable solution: 120 milligram(s) subcutaneously 2 times a day  please give 14 day supply    stool softener: 2 cap(s) orally every other day (at bedtime), As Needed
